# Patient Record
Sex: FEMALE | Race: WHITE | Employment: OTHER | URBAN - METROPOLITAN AREA
[De-identification: names, ages, dates, MRNs, and addresses within clinical notes are randomized per-mention and may not be internally consistent; named-entity substitution may affect disease eponyms.]

---

## 2017-01-19 RX ORDER — BLOOD-GLUCOSE METER
KIT MISCELLANEOUS
Qty: 100 STRIP | Refills: 1 | Status: SHIPPED | OUTPATIENT
Start: 2017-01-19 | End: 2017-01-20 | Stop reason: SDUPTHER

## 2017-01-20 ENCOUNTER — OFFICE VISIT (OUTPATIENT)
Dept: FAMILY MEDICINE CLINIC | Age: 82
End: 2017-01-20

## 2017-01-20 ENCOUNTER — HOSPITAL ENCOUNTER (OUTPATIENT)
Dept: GENERAL RADIOLOGY | Age: 82
Discharge: HOME | End: 2017-01-20

## 2017-01-20 ENCOUNTER — HOSPITAL ENCOUNTER (OUTPATIENT)
Dept: OTHER | Age: 82
Discharge: HOME | End: 2017-01-20
Attending: PHYSICIAN ASSISTANT | Admitting: PHYSICIAN ASSISTANT

## 2017-01-20 VITALS
BODY MASS INDEX: 27.79 KG/M2 | DIASTOLIC BLOOD PRESSURE: 70 MMHG | HEIGHT: 65 IN | WEIGHT: 166.8 LBS | SYSTOLIC BLOOD PRESSURE: 130 MMHG

## 2017-01-20 DIAGNOSIS — M25.511 ACUTE PAIN OF RIGHT SHOULDER: ICD-10-CM

## 2017-01-20 DIAGNOSIS — M25.511 ACUTE PAIN OF RIGHT SHOULDER: Primary | ICD-10-CM

## 2017-01-20 PROCEDURE — 1123F ACP DISCUSS/DSCN MKR DOCD: CPT | Performed by: PHYSICIAN ASSISTANT

## 2017-01-20 PROCEDURE — 4040F PNEUMOC VAC/ADMIN/RCVD: CPT | Performed by: PHYSICIAN ASSISTANT

## 2017-01-20 PROCEDURE — G8427 DOCREV CUR MEDS BY ELIG CLIN: HCPCS | Performed by: PHYSICIAN ASSISTANT

## 2017-01-20 PROCEDURE — G8484 FLU IMMUNIZE NO ADMIN: HCPCS | Performed by: PHYSICIAN ASSISTANT

## 2017-01-20 PROCEDURE — G8599 NO ASA/ANTIPLAT THER USE RNG: HCPCS | Performed by: PHYSICIAN ASSISTANT

## 2017-01-20 PROCEDURE — 1036F TOBACCO NON-USER: CPT | Performed by: PHYSICIAN ASSISTANT

## 2017-01-20 PROCEDURE — 1090F PRES/ABSN URINE INCON ASSESS: CPT | Performed by: PHYSICIAN ASSISTANT

## 2017-01-20 PROCEDURE — G8420 CALC BMI NORM PARAMETERS: HCPCS | Performed by: PHYSICIAN ASSISTANT

## 2017-01-20 PROCEDURE — 99213 OFFICE O/P EST LOW 20 MIN: CPT | Performed by: PHYSICIAN ASSISTANT

## 2017-01-20 ASSESSMENT — ENCOUNTER SYMPTOMS: RESPIRATORY NEGATIVE: 1

## 2017-02-16 ENCOUNTER — OFFICE VISIT (OUTPATIENT)
Dept: ORTHOPEDIC SURGERY | Age: 82
End: 2017-02-16

## 2017-02-16 VITALS
HEIGHT: 65 IN | DIASTOLIC BLOOD PRESSURE: 73 MMHG | SYSTOLIC BLOOD PRESSURE: 132 MMHG | BODY MASS INDEX: 27.81 KG/M2 | WEIGHT: 166.89 LBS | HEART RATE: 70 BPM

## 2017-02-16 DIAGNOSIS — M25.511 RIGHT SHOULDER PAIN, UNSPECIFIED CHRONICITY: ICD-10-CM

## 2017-02-16 DIAGNOSIS — M75.41 SHOULDER IMPINGEMENT, RIGHT: Primary | ICD-10-CM

## 2017-02-16 PROBLEM — M25.819 SHOULDER IMPINGEMENT: Status: ACTIVE | Noted: 2017-02-16

## 2017-02-16 PROBLEM — M75.40 SHOULDER IMPINGEMENT: Status: ACTIVE | Noted: 2017-02-16

## 2017-02-16 PROCEDURE — G8599 NO ASA/ANTIPLAT THER USE RNG: HCPCS | Performed by: ORTHOPAEDIC SURGERY

## 2017-02-16 PROCEDURE — 1123F ACP DISCUSS/DSCN MKR DOCD: CPT | Performed by: ORTHOPAEDIC SURGERY

## 2017-02-16 PROCEDURE — 4040F PNEUMOC VAC/ADMIN/RCVD: CPT | Performed by: ORTHOPAEDIC SURGERY

## 2017-02-16 PROCEDURE — G8420 CALC BMI NORM PARAMETERS: HCPCS | Performed by: ORTHOPAEDIC SURGERY

## 2017-02-16 PROCEDURE — 99213 OFFICE O/P EST LOW 20 MIN: CPT | Performed by: ORTHOPAEDIC SURGERY

## 2017-02-16 PROCEDURE — 1090F PRES/ABSN URINE INCON ASSESS: CPT | Performed by: ORTHOPAEDIC SURGERY

## 2017-02-16 PROCEDURE — 73030 X-RAY EXAM OF SHOULDER: CPT | Performed by: ORTHOPAEDIC SURGERY

## 2017-02-16 PROCEDURE — G8484 FLU IMMUNIZE NO ADMIN: HCPCS | Performed by: ORTHOPAEDIC SURGERY

## 2017-02-16 PROCEDURE — 1036F TOBACCO NON-USER: CPT | Performed by: ORTHOPAEDIC SURGERY

## 2017-02-16 PROCEDURE — G8427 DOCREV CUR MEDS BY ELIG CLIN: HCPCS | Performed by: ORTHOPAEDIC SURGERY

## 2017-02-28 ENCOUNTER — OFFICE VISIT (OUTPATIENT)
Dept: FAMILY MEDICINE CLINIC | Age: 82
End: 2017-02-28

## 2017-02-28 VITALS
SYSTOLIC BLOOD PRESSURE: 130 MMHG | DIASTOLIC BLOOD PRESSURE: 80 MMHG | BODY MASS INDEX: 27.49 KG/M2 | WEIGHT: 165 LBS | HEIGHT: 65 IN

## 2017-02-28 DIAGNOSIS — E11.59 TYPE 2 DIABETES MELLITUS WITH OTHER CIRCULATORY COMPLICATION: Primary | ICD-10-CM

## 2017-02-28 DIAGNOSIS — E78.00 PURE HYPERCHOLESTEROLEMIA: ICD-10-CM

## 2017-02-28 DIAGNOSIS — E11.42 DIABETIC POLYNEUROPATHY ASSOCIATED WITH TYPE 2 DIABETES MELLITUS (HCC): ICD-10-CM

## 2017-02-28 DIAGNOSIS — I10 ESSENTIAL HYPERTENSION: ICD-10-CM

## 2017-02-28 LAB
A/G RATIO: 1.8 (ref 1.1–2.2)
ALBUMIN SERPL-MCNC: 4 G/DL (ref 3.4–5)
ALP BLD-CCNC: 36 U/L (ref 40–129)
ALT SERPL-CCNC: 20 U/L (ref 10–40)
ANION GAP SERPL CALCULATED.3IONS-SCNC: 18 MMOL/L (ref 3–16)
AST SERPL-CCNC: 15 U/L (ref 15–37)
BILIRUB SERPL-MCNC: 0.5 MG/DL (ref 0–1)
BUN BLDV-MCNC: 32 MG/DL (ref 7–20)
CALCIUM SERPL-MCNC: 9.5 MG/DL (ref 8.3–10.6)
CHLORIDE BLD-SCNC: 101 MMOL/L (ref 99–110)
CHOLESTEROL, TOTAL: 159 MG/DL (ref 0–199)
CO2: 23 MMOL/L (ref 21–32)
CREAT SERPL-MCNC: 1.1 MG/DL (ref 0.6–1.2)
GFR AFRICAN AMERICAN: 57
GFR NON-AFRICAN AMERICAN: 47
GLOBULIN: 2.2 G/DL
GLUCOSE BLD-MCNC: 116 MG/DL (ref 70–99)
HBA1C MFR BLD: 6.4 %
HDLC SERPL-MCNC: 71 MG/DL (ref 40–60)
LDL CHOLESTEROL CALCULATED: 60 MG/DL
POTASSIUM SERPL-SCNC: 4.5 MMOL/L (ref 3.5–5.1)
SODIUM BLD-SCNC: 142 MMOL/L (ref 136–145)
TOTAL PROTEIN: 6.2 G/DL (ref 6.4–8.2)
TRIGL SERPL-MCNC: 139 MG/DL (ref 0–150)
VLDLC SERPL CALC-MCNC: 28 MG/DL

## 2017-02-28 PROCEDURE — 4040F PNEUMOC VAC/ADMIN/RCVD: CPT | Performed by: FAMILY MEDICINE

## 2017-02-28 PROCEDURE — 99214 OFFICE O/P EST MOD 30 MIN: CPT | Performed by: FAMILY MEDICINE

## 2017-02-28 PROCEDURE — G8427 DOCREV CUR MEDS BY ELIG CLIN: HCPCS | Performed by: FAMILY MEDICINE

## 2017-02-28 PROCEDURE — G8484 FLU IMMUNIZE NO ADMIN: HCPCS | Performed by: FAMILY MEDICINE

## 2017-02-28 PROCEDURE — G8420 CALC BMI NORM PARAMETERS: HCPCS | Performed by: FAMILY MEDICINE

## 2017-02-28 PROCEDURE — 1090F PRES/ABSN URINE INCON ASSESS: CPT | Performed by: FAMILY MEDICINE

## 2017-02-28 PROCEDURE — 83036 HEMOGLOBIN GLYCOSYLATED A1C: CPT | Performed by: FAMILY MEDICINE

## 2017-02-28 PROCEDURE — 1036F TOBACCO NON-USER: CPT | Performed by: FAMILY MEDICINE

## 2017-02-28 PROCEDURE — 1123F ACP DISCUSS/DSCN MKR DOCD: CPT | Performed by: FAMILY MEDICINE

## 2017-02-28 PROCEDURE — 36415 COLL VENOUS BLD VENIPUNCTURE: CPT | Performed by: FAMILY MEDICINE

## 2017-02-28 PROCEDURE — G8599 NO ASA/ANTIPLAT THER USE RNG: HCPCS | Performed by: FAMILY MEDICINE

## 2017-03-30 ENCOUNTER — OFFICE VISIT (OUTPATIENT)
Dept: ORTHOPEDIC SURGERY | Age: 82
End: 2017-03-30

## 2017-03-30 VITALS
HEART RATE: 66 BPM | WEIGHT: 164.9 LBS | BODY MASS INDEX: 27.47 KG/M2 | SYSTOLIC BLOOD PRESSURE: 133 MMHG | HEIGHT: 65 IN | DIASTOLIC BLOOD PRESSURE: 72 MMHG

## 2017-03-30 DIAGNOSIS — M75.41 SHOULDER IMPINGEMENT, RIGHT: Primary | ICD-10-CM

## 2017-03-30 PROCEDURE — G8427 DOCREV CUR MEDS BY ELIG CLIN: HCPCS | Performed by: ORTHOPAEDIC SURGERY

## 2017-03-30 PROCEDURE — G8420 CALC BMI NORM PARAMETERS: HCPCS | Performed by: ORTHOPAEDIC SURGERY

## 2017-03-30 PROCEDURE — 1036F TOBACCO NON-USER: CPT | Performed by: ORTHOPAEDIC SURGERY

## 2017-03-30 PROCEDURE — 1123F ACP DISCUSS/DSCN MKR DOCD: CPT | Performed by: ORTHOPAEDIC SURGERY

## 2017-03-30 PROCEDURE — 1090F PRES/ABSN URINE INCON ASSESS: CPT | Performed by: ORTHOPAEDIC SURGERY

## 2017-03-30 PROCEDURE — 99213 OFFICE O/P EST LOW 20 MIN: CPT | Performed by: ORTHOPAEDIC SURGERY

## 2017-03-30 PROCEDURE — 4040F PNEUMOC VAC/ADMIN/RCVD: CPT | Performed by: ORTHOPAEDIC SURGERY

## 2017-03-30 PROCEDURE — G8599 NO ASA/ANTIPLAT THER USE RNG: HCPCS | Performed by: ORTHOPAEDIC SURGERY

## 2017-03-30 PROCEDURE — G8484 FLU IMMUNIZE NO ADMIN: HCPCS | Performed by: ORTHOPAEDIC SURGERY

## 2017-04-17 RX ORDER — SIMVASTATIN 40 MG
TABLET ORAL
Qty: 90 TABLET | Refills: 1 | Status: SHIPPED | OUTPATIENT
Start: 2017-04-17 | End: 2017-11-24 | Stop reason: SDUPTHER

## 2017-05-30 ENCOUNTER — OFFICE VISIT (OUTPATIENT)
Dept: FAMILY MEDICINE CLINIC | Age: 82
End: 2017-05-30

## 2017-05-30 VITALS
HEART RATE: 62 BPM | HEIGHT: 65 IN | DIASTOLIC BLOOD PRESSURE: 64 MMHG | BODY MASS INDEX: 27.79 KG/M2 | OXYGEN SATURATION: 96 % | SYSTOLIC BLOOD PRESSURE: 126 MMHG | WEIGHT: 166.8 LBS

## 2017-05-30 DIAGNOSIS — E11.59 TYPE 2 DIABETES MELLITUS WITH OTHER CIRCULATORY COMPLICATION: Primary | ICD-10-CM

## 2017-05-30 DIAGNOSIS — E78.00 PURE HYPERCHOLESTEROLEMIA: ICD-10-CM

## 2017-05-30 DIAGNOSIS — I25.10 CORONARY ARTERY DISEASE INVOLVING NATIVE CORONARY ARTERY OF NATIVE HEART WITHOUT ANGINA PECTORIS: ICD-10-CM

## 2017-05-30 DIAGNOSIS — E11.42 DIABETIC POLYNEUROPATHY ASSOCIATED WITH TYPE 2 DIABETES MELLITUS (HCC): ICD-10-CM

## 2017-05-30 DIAGNOSIS — I10 ESSENTIAL HYPERTENSION: ICD-10-CM

## 2017-05-30 LAB — HBA1C MFR BLD: 6.6 %

## 2017-05-30 PROCEDURE — 1123F ACP DISCUSS/DSCN MKR DOCD: CPT | Performed by: FAMILY MEDICINE

## 2017-05-30 PROCEDURE — 4040F PNEUMOC VAC/ADMIN/RCVD: CPT | Performed by: FAMILY MEDICINE

## 2017-05-30 PROCEDURE — 83036 HEMOGLOBIN GLYCOSYLATED A1C: CPT | Performed by: FAMILY MEDICINE

## 2017-05-30 PROCEDURE — 1090F PRES/ABSN URINE INCON ASSESS: CPT | Performed by: FAMILY MEDICINE

## 2017-05-30 PROCEDURE — G8599 NO ASA/ANTIPLAT THER USE RNG: HCPCS | Performed by: FAMILY MEDICINE

## 2017-05-30 PROCEDURE — G8427 DOCREV CUR MEDS BY ELIG CLIN: HCPCS | Performed by: FAMILY MEDICINE

## 2017-05-30 PROCEDURE — 99214 OFFICE O/P EST MOD 30 MIN: CPT | Performed by: FAMILY MEDICINE

## 2017-05-30 PROCEDURE — G8420 CALC BMI NORM PARAMETERS: HCPCS | Performed by: FAMILY MEDICINE

## 2017-05-30 PROCEDURE — 1036F TOBACCO NON-USER: CPT | Performed by: FAMILY MEDICINE

## 2017-06-08 RX ORDER — TRIAMTERENE AND HYDROCHLOROTHIAZIDE 37.5; 25 MG/1; MG/1
TABLET ORAL
Qty: 90 TABLET | Refills: 3 | Status: SHIPPED | OUTPATIENT
Start: 2017-06-08 | End: 2018-06-15 | Stop reason: SDUPTHER

## 2017-08-29 ENCOUNTER — OFFICE VISIT (OUTPATIENT)
Dept: FAMILY MEDICINE CLINIC | Age: 82
End: 2017-08-29

## 2017-08-29 VITALS
HEART RATE: 63 BPM | OXYGEN SATURATION: 94 % | DIASTOLIC BLOOD PRESSURE: 64 MMHG | BODY MASS INDEX: 28 KG/M2 | WEIGHT: 164 LBS | SYSTOLIC BLOOD PRESSURE: 122 MMHG | HEIGHT: 64 IN

## 2017-08-29 DIAGNOSIS — E11.51 TYPE 2 DIABETES MELLITUS WITH DIABETIC PERIPHERAL ANGIOPATHY WITHOUT GANGRENE, WITHOUT LONG-TERM CURRENT USE OF INSULIN (HCC): Primary | ICD-10-CM

## 2017-08-29 DIAGNOSIS — Z23 NEEDS FLU SHOT: ICD-10-CM

## 2017-08-29 DIAGNOSIS — I10 ESSENTIAL HYPERTENSION: ICD-10-CM

## 2017-08-29 DIAGNOSIS — R13.19 ESOPHAGEAL DYSPHAGIA: ICD-10-CM

## 2017-08-29 DIAGNOSIS — E78.00 PURE HYPERCHOLESTEROLEMIA: ICD-10-CM

## 2017-08-29 DIAGNOSIS — I25.10 CORONARY ARTERY DISEASE INVOLVING NATIVE CORONARY ARTERY OF NATIVE HEART WITHOUT ANGINA PECTORIS: ICD-10-CM

## 2017-08-29 LAB
A/G RATIO: 1.8 (ref 1.1–2.2)
ALBUMIN SERPL-MCNC: 4 G/DL (ref 3.4–5)
ALP BLD-CCNC: 35 U/L (ref 40–129)
ALT SERPL-CCNC: 14 U/L (ref 10–40)
ANION GAP SERPL CALCULATED.3IONS-SCNC: 17 MMOL/L (ref 3–16)
AST SERPL-CCNC: 12 U/L (ref 15–37)
BILIRUB SERPL-MCNC: 0.6 MG/DL (ref 0–1)
BUN BLDV-MCNC: 37 MG/DL (ref 7–20)
CALCIUM SERPL-MCNC: 10 MG/DL (ref 8.3–10.6)
CHLORIDE BLD-SCNC: 102 MMOL/L (ref 99–110)
CHOLESTEROL, TOTAL: 181 MG/DL (ref 0–199)
CO2: 23 MMOL/L (ref 21–32)
CREAT SERPL-MCNC: 1.2 MG/DL (ref 0.6–1.2)
GFR AFRICAN AMERICAN: 51
GFR NON-AFRICAN AMERICAN: 42
GLOBULIN: 2.2 G/DL
GLUCOSE BLD-MCNC: 134 MG/DL (ref 70–99)
HBA1C MFR BLD: 6.5 %
HDLC SERPL-MCNC: 70 MG/DL (ref 40–60)
LDL CHOLESTEROL CALCULATED: 78 MG/DL
POTASSIUM SERPL-SCNC: 4.3 MMOL/L (ref 3.5–5.1)
SODIUM BLD-SCNC: 142 MMOL/L (ref 136–145)
TOTAL PROTEIN: 6.2 G/DL (ref 6.4–8.2)
TRIGL SERPL-MCNC: 167 MG/DL (ref 0–150)
VLDLC SERPL CALC-MCNC: 33 MG/DL

## 2017-08-29 PROCEDURE — 83036 HEMOGLOBIN GLYCOSYLATED A1C: CPT | Performed by: FAMILY MEDICINE

## 2017-08-29 PROCEDURE — 99214 OFFICE O/P EST MOD 30 MIN: CPT | Performed by: FAMILY MEDICINE

## 2017-08-29 PROCEDURE — G8598 ASA/ANTIPLAT THER USED: HCPCS | Performed by: FAMILY MEDICINE

## 2017-08-29 PROCEDURE — 90662 IIV NO PRSV INCREASED AG IM: CPT | Performed by: FAMILY MEDICINE

## 2017-08-29 PROCEDURE — G0008 ADMIN INFLUENZA VIRUS VAC: HCPCS | Performed by: FAMILY MEDICINE

## 2017-08-29 PROCEDURE — 1123F ACP DISCUSS/DSCN MKR DOCD: CPT | Performed by: FAMILY MEDICINE

## 2017-08-29 PROCEDURE — G8427 DOCREV CUR MEDS BY ELIG CLIN: HCPCS | Performed by: FAMILY MEDICINE

## 2017-08-29 PROCEDURE — 4040F PNEUMOC VAC/ADMIN/RCVD: CPT | Performed by: FAMILY MEDICINE

## 2017-08-29 PROCEDURE — 1036F TOBACCO NON-USER: CPT | Performed by: FAMILY MEDICINE

## 2017-08-29 PROCEDURE — 36415 COLL VENOUS BLD VENIPUNCTURE: CPT | Performed by: FAMILY MEDICINE

## 2017-08-29 PROCEDURE — G8419 CALC BMI OUT NRM PARAM NOF/U: HCPCS | Performed by: FAMILY MEDICINE

## 2017-08-29 PROCEDURE — 1090F PRES/ABSN URINE INCON ASSESS: CPT | Performed by: FAMILY MEDICINE

## 2017-08-29 ASSESSMENT — PATIENT HEALTH QUESTIONNAIRE - PHQ9
1. LITTLE INTEREST OR PLEASURE IN DOING THINGS: 0
2. FEELING DOWN, DEPRESSED OR HOPELESS: 0
SUM OF ALL RESPONSES TO PHQ QUESTIONS 1-9: 0
SUM OF ALL RESPONSES TO PHQ9 QUESTIONS 1 & 2: 0

## 2017-11-27 RX ORDER — SIMVASTATIN 40 MG
TABLET ORAL
Qty: 90 TABLET | Refills: 1 | Status: SHIPPED | OUTPATIENT
Start: 2017-11-27 | End: 2018-06-04 | Stop reason: SDUPTHER

## 2017-11-28 ENCOUNTER — OFFICE VISIT (OUTPATIENT)
Dept: FAMILY MEDICINE CLINIC | Age: 82
End: 2017-11-28

## 2017-11-28 VITALS
SYSTOLIC BLOOD PRESSURE: 122 MMHG | BODY MASS INDEX: 28.17 KG/M2 | WEIGHT: 165 LBS | DIASTOLIC BLOOD PRESSURE: 64 MMHG | HEIGHT: 64 IN

## 2017-11-28 DIAGNOSIS — E78.00 PURE HYPERCHOLESTEROLEMIA: ICD-10-CM

## 2017-11-28 DIAGNOSIS — E11.51 TYPE 2 DIABETES MELLITUS WITH DIABETIC PERIPHERAL ANGIOPATHY WITHOUT GANGRENE, WITHOUT LONG-TERM CURRENT USE OF INSULIN (HCC): Primary | ICD-10-CM

## 2017-11-28 DIAGNOSIS — I10 ESSENTIAL HYPERTENSION: ICD-10-CM

## 2017-11-28 PROBLEM — M25.819 SHOULDER IMPINGEMENT: Status: RESOLVED | Noted: 2017-02-16 | Resolved: 2017-11-28

## 2017-11-28 PROBLEM — M75.40 SHOULDER IMPINGEMENT: Status: RESOLVED | Noted: 2017-02-16 | Resolved: 2017-11-28

## 2017-11-28 LAB — HBA1C MFR BLD: 6.7 %

## 2017-11-28 PROCEDURE — G8417 CALC BMI ABV UP PARAM F/U: HCPCS | Performed by: FAMILY MEDICINE

## 2017-11-28 PROCEDURE — 99214 OFFICE O/P EST MOD 30 MIN: CPT | Performed by: FAMILY MEDICINE

## 2017-11-28 PROCEDURE — 1090F PRES/ABSN URINE INCON ASSESS: CPT | Performed by: FAMILY MEDICINE

## 2017-11-28 PROCEDURE — G8598 ASA/ANTIPLAT THER USED: HCPCS | Performed by: FAMILY MEDICINE

## 2017-11-28 PROCEDURE — 1123F ACP DISCUSS/DSCN MKR DOCD: CPT | Performed by: FAMILY MEDICINE

## 2017-11-28 PROCEDURE — G8484 FLU IMMUNIZE NO ADMIN: HCPCS | Performed by: FAMILY MEDICINE

## 2017-11-28 PROCEDURE — 4040F PNEUMOC VAC/ADMIN/RCVD: CPT | Performed by: FAMILY MEDICINE

## 2017-11-28 PROCEDURE — G8427 DOCREV CUR MEDS BY ELIG CLIN: HCPCS | Performed by: FAMILY MEDICINE

## 2017-11-28 PROCEDURE — 1036F TOBACCO NON-USER: CPT | Performed by: FAMILY MEDICINE

## 2017-11-28 PROCEDURE — 83036 HEMOGLOBIN GLYCOSYLATED A1C: CPT | Performed by: FAMILY MEDICINE

## 2017-11-28 NOTE — PROGRESS NOTES
hemoglobin (Hb A1C), Well controlled continue current medicines    2. Essential hypertension  Well controlled    3. Pure hypercholesterolemia  Well controlled              Plan:  1)  Medication: continue current medication regimen unchanged  2)  Recheck in 3 months, sooner should new symptoms or problems arise. 3) LLR, Reviewed  [place] received counseling on the following healthy behaviors: nutrition, exercise and medication adherence         Discussed use, benefit, and side effects of prescribed medications. Barriers to medication compliance addressed. All patient questions answered. Pt voiced understanding.            Steffany López M.D.

## 2018-01-16 ENCOUNTER — TELEPHONE (OUTPATIENT)
Dept: FAMILY MEDICINE CLINIC | Age: 83
End: 2018-01-16

## 2018-01-16 NOTE — TELEPHONE ENCOUNTER
We received a fax from Dr. Saurabh Moe, [facility]. They are requesting patients, most recent hemoglobin A1c. I have faxed over recent A1c reading this am, for patients appointment today with Dr. Ignacia Roberts. *Fax confirmation attached, to today's telephone encounter.

## 2018-02-27 ENCOUNTER — OFFICE VISIT (OUTPATIENT)
Dept: FAMILY MEDICINE CLINIC | Age: 83
End: 2018-02-27

## 2018-02-27 VITALS
OXYGEN SATURATION: 94 % | HEIGHT: 64 IN | DIASTOLIC BLOOD PRESSURE: 56 MMHG | HEART RATE: 62 BPM | BODY MASS INDEX: 27.96 KG/M2 | SYSTOLIC BLOOD PRESSURE: 118 MMHG | WEIGHT: 163.8 LBS

## 2018-02-27 DIAGNOSIS — E11.42 DIABETIC POLYNEUROPATHY ASSOCIATED WITH TYPE 2 DIABETES MELLITUS (HCC): ICD-10-CM

## 2018-02-27 DIAGNOSIS — E78.00 PURE HYPERCHOLESTEROLEMIA: ICD-10-CM

## 2018-02-27 DIAGNOSIS — E11.51 TYPE 2 DIABETES MELLITUS WITH DIABETIC PERIPHERAL ANGIOPATHY WITHOUT GANGRENE, WITHOUT LONG-TERM CURRENT USE OF INSULIN (HCC): Primary | ICD-10-CM

## 2018-02-27 DIAGNOSIS — I10 ESSENTIAL HYPERTENSION: ICD-10-CM

## 2018-02-27 LAB
A/G RATIO: 2.1 (ref 1.1–2.2)
ALBUMIN SERPL-MCNC: 4.2 G/DL (ref 3.4–5)
ALP BLD-CCNC: 36 U/L (ref 40–129)
ALT SERPL-CCNC: 16 U/L (ref 10–40)
ANION GAP SERPL CALCULATED.3IONS-SCNC: 15 MMOL/L (ref 3–16)
AST SERPL-CCNC: 15 U/L (ref 15–37)
BILIRUB SERPL-MCNC: 0.6 MG/DL (ref 0–1)
BUN BLDV-MCNC: 31 MG/DL (ref 7–20)
CALCIUM SERPL-MCNC: 9.2 MG/DL (ref 8.3–10.6)
CHLORIDE BLD-SCNC: 103 MMOL/L (ref 99–110)
CHOLESTEROL, TOTAL: 138 MG/DL (ref 0–199)
CO2: 25 MMOL/L (ref 21–32)
CREAT SERPL-MCNC: 1.1 MG/DL (ref 0.6–1.2)
GFR AFRICAN AMERICAN: 57
GFR NON-AFRICAN AMERICAN: 47
GLOBULIN: 2 G/DL
GLUCOSE BLD-MCNC: 120 MG/DL (ref 70–99)
HBA1C MFR BLD: 6.7 %
HDLC SERPL-MCNC: 67 MG/DL (ref 40–60)
LDL CHOLESTEROL CALCULATED: 40 MG/DL
POTASSIUM SERPL-SCNC: 4.2 MMOL/L (ref 3.5–5.1)
SODIUM BLD-SCNC: 143 MMOL/L (ref 136–145)
TOTAL PROTEIN: 6.2 G/DL (ref 6.4–8.2)
TRIGL SERPL-MCNC: 154 MG/DL (ref 0–150)
VLDLC SERPL CALC-MCNC: 31 MG/DL

## 2018-02-27 PROCEDURE — G8427 DOCREV CUR MEDS BY ELIG CLIN: HCPCS | Performed by: FAMILY MEDICINE

## 2018-02-27 PROCEDURE — G8484 FLU IMMUNIZE NO ADMIN: HCPCS | Performed by: FAMILY MEDICINE

## 2018-02-27 PROCEDURE — G8417 CALC BMI ABV UP PARAM F/U: HCPCS | Performed by: FAMILY MEDICINE

## 2018-02-27 PROCEDURE — 1036F TOBACCO NON-USER: CPT | Performed by: FAMILY MEDICINE

## 2018-02-27 PROCEDURE — 36415 COLL VENOUS BLD VENIPUNCTURE: CPT | Performed by: FAMILY MEDICINE

## 2018-02-27 PROCEDURE — 1090F PRES/ABSN URINE INCON ASSESS: CPT | Performed by: FAMILY MEDICINE

## 2018-02-27 PROCEDURE — 1123F ACP DISCUSS/DSCN MKR DOCD: CPT | Performed by: FAMILY MEDICINE

## 2018-02-27 PROCEDURE — 4040F PNEUMOC VAC/ADMIN/RCVD: CPT | Performed by: FAMILY MEDICINE

## 2018-02-27 PROCEDURE — 83036 HEMOGLOBIN GLYCOSYLATED A1C: CPT | Performed by: FAMILY MEDICINE

## 2018-02-27 PROCEDURE — 99214 OFFICE O/P EST MOD 30 MIN: CPT | Performed by: FAMILY MEDICINE

## 2018-02-27 PROCEDURE — G8598 ASA/ANTIPLAT THER USED: HCPCS | Performed by: FAMILY MEDICINE

## 2018-02-27 RX ORDER — GABAPENTIN 100 MG/1
100 CAPSULE ORAL 3 TIMES DAILY
Qty: 90 CAPSULE | Refills: 3 | Status: SHIPPED | OUTPATIENT
Start: 2018-02-27 | End: 2018-04-12 | Stop reason: SDUPTHER

## 2018-02-27 RX ORDER — FLUTICASONE PROPIONATE 50 MCG
2 SPRAY, SUSPENSION (ML) NASAL DAILY
Qty: 1 BOTTLE | Refills: 3 | Status: SHIPPED | OUTPATIENT
Start: 2018-02-27 | End: 2018-06-04 | Stop reason: SDUPTHER

## 2018-02-27 NOTE — PROGRESS NOTES
BP Readings from Last 2 Encounters:   11/28/17 122/64   08/29/17 122/64     Hemoglobin A1C (%)   Date Value   11/28/2017 6.7     Microalbumin, Random Urine (mg/dL)   Date Value   10/21/2014 <1.20     LDL Calculated (mg/dL)   Date Value   08/29/2017 78              Tobacco use:  Patient  reports that she quit smoking about 34 years ago. She has never used smokeless tobacco.    If Smoker - Cessation materials given? No    Is Daily aspirin on medication list?:  Yes    Diabetic retinal exam done? No   If yes, document in Health Maintenance. Monofilament placed on counter? Yes    Shoes and socks removed? Yes    BP taken with correct size cuff? Yes   Repeated if > 140/90 NA     Is patient taking any medications for diabetes    Yes   If yes, see medication list    Is the patient reporting any side effects of diabetic medications? No    Microalbumin performed if applicable? 10/21/14      Is patient taking any over the counter medications    Yes   If yes, see medication list  SUBJECTIVE:  Kel Fuller is a 80 y.o. female who presents for evaluation of hypertension, Diabetes Mellitus and hyperlipidemia. She indicates that she is feeling well and denies any symptoms referable to her elevated blood pressure, diabetes or hyperlipidemia. Specifically denies chest pain, , dyspnea, orthopnea, PND,peripheral edema , . No anorexia,  or leg cramps noted. No episodes of hypoglycemia. Current medication regimen is as listed below. She denies any side effects of medication, and has been taking it regularly. Patient has a history of diabetic neuropathy. She sees a podiatrist on a regular basis. She states that her foot pain is worsening to the point where is hard for her to ambulate. She has never been on gabapentin or any other medications for this. She is due to see cardiology this spring. She's having a dental procedure tomorrow.  She started with some nasal congestion and cough yesterday    Blood Pressure:   Blood pressures are not being checked at home. Ranges have been :     Diabetes: The last A1C: 6.7. Diabetic complications are: Neuropathy CAD. The Blood sugars range at home have been: . Last Eye Exam : 2017. The last microalbumin:    Lab Results   Component Value Date    LABMICR <1.20 10/21/2014         she is taking aspirin. History   Smoking Status    Former Smoker    Quit date: 9/10/1983   Smokeless Tobacco    Never Used       Current Outpatient Prescriptions   Medication Sig Dispense Refill    fluticasone (FLONASE) 50 MCG/ACT nasal spray 2 sprays by Nasal route daily 1 Bottle 3    gabapentin (NEURONTIN) 100 MG capsule Take 1 capsule by mouth 3 times daily for 59 days. 90 capsule 3    simvastatin (ZOCOR) 40 MG tablet TAKE 1 TABLET BY MOUTH IN THE EVENING 90 tablet 1    metFORMIN (GLUCOPHAGE) 500 MG tablet TAKE 1 TABLET BY MOUTH 2 TIMES DAILY (WITH MEALS). 180 tablet 1    aspirin 81 MG tablet Take 81 mg by mouth daily      triamterene-hydrochlorothiazide (MAXZIDE-25) 37.5-25 MG per tablet TAKE 1 TABLET BY MOUTH EVERY DAY 90 tablet 3    glucose blood VI test strips (FREESTYLE LITE) strip Pt test twice daily E11.9 100 strip 1    Compression Stockings MISC by Does not apply route 1 each 0    vitamin B-12 (CYANOCOBALAMIN) 250 MCG tablet Take 250 mcg by mouth daily      vitamin D3 (COLECALCIFEROL) 400 UNITS TABS Take by mouth daily      FREESTYLE LANCETS MISC TEST TWICE A DAY AS DIRECTED dx 250.00 100 each 3    ibuprofen (ADVIL;MOTRIN) 200 MG tablet Take 200 mg by mouth 2 times daily.  Multiple Vitamin (MULTIVITAMIN PO) Take  by mouth daily.  metoprolol (LOPRESSOR) 25 MG tablet 25 mg 2 times daily. Patient instructed to take beta blockers with a sip of water the morning of surgery per Dr. Eb Elaine. No current facility-administered medications for this visit.         OBJECTIVE:  BP (!) 118/56 (Site: Left Arm, Position: Sitting, Cuff Size: Medium Adult)   Pulse 62   Ht 5' 4\" (1.626 m)

## 2018-03-02 ENCOUNTER — TELEPHONE (OUTPATIENT)
Dept: FAMILY MEDICINE CLINIC | Age: 83
End: 2018-03-02

## 2018-03-02 NOTE — TELEPHONE ENCOUNTER
The patient is calling to give an update on the gabapentin medication Dr. Burton Devi started her on recently. She said that she recently started with a cold so she's not sure how the medication is doing because the symptoms from the cold are there. She is going to see if her symptoms clear up then she will let us know how the medication is doing for her.

## 2018-03-06 ENCOUNTER — TELEPHONE (OUTPATIENT)
Dept: FAMILY MEDICINE CLINIC | Age: 83
End: 2018-03-06

## 2018-03-07 ENCOUNTER — OFFICE VISIT (OUTPATIENT)
Dept: FAMILY MEDICINE CLINIC | Age: 83
End: 2018-03-07

## 2018-03-07 VITALS
OXYGEN SATURATION: 90 % | HEIGHT: 64 IN | HEART RATE: 75 BPM | WEIGHT: 160.4 LBS | SYSTOLIC BLOOD PRESSURE: 102 MMHG | DIASTOLIC BLOOD PRESSURE: 50 MMHG | BODY MASS INDEX: 27.39 KG/M2

## 2018-03-07 DIAGNOSIS — R05.9 COUGH: Primary | ICD-10-CM

## 2018-03-07 DIAGNOSIS — R06.02 SOB (SHORTNESS OF BREATH): ICD-10-CM

## 2018-03-07 PROCEDURE — 4040F PNEUMOC VAC/ADMIN/RCVD: CPT | Performed by: PHYSICIAN ASSISTANT

## 2018-03-07 PROCEDURE — G8484 FLU IMMUNIZE NO ADMIN: HCPCS | Performed by: PHYSICIAN ASSISTANT

## 2018-03-07 PROCEDURE — 1090F PRES/ABSN URINE INCON ASSESS: CPT | Performed by: PHYSICIAN ASSISTANT

## 2018-03-07 PROCEDURE — G8598 ASA/ANTIPLAT THER USED: HCPCS | Performed by: PHYSICIAN ASSISTANT

## 2018-03-07 PROCEDURE — G8427 DOCREV CUR MEDS BY ELIG CLIN: HCPCS | Performed by: PHYSICIAN ASSISTANT

## 2018-03-07 PROCEDURE — 99213 OFFICE O/P EST LOW 20 MIN: CPT | Performed by: PHYSICIAN ASSISTANT

## 2018-03-07 PROCEDURE — 1123F ACP DISCUSS/DSCN MKR DOCD: CPT | Performed by: PHYSICIAN ASSISTANT

## 2018-03-07 PROCEDURE — G8417 CALC BMI ABV UP PARAM F/U: HCPCS | Performed by: PHYSICIAN ASSISTANT

## 2018-03-07 PROCEDURE — 1036F TOBACCO NON-USER: CPT | Performed by: PHYSICIAN ASSISTANT

## 2018-03-07 RX ORDER — BENZONATATE 100 MG/1
100 CAPSULE ORAL 3 TIMES DAILY PRN
Qty: 21 CAPSULE | Refills: 0 | Status: SHIPPED | OUTPATIENT
Start: 2018-03-07 | End: 2018-03-14

## 2018-03-07 ASSESSMENT — ENCOUNTER SYMPTOMS
COUGH: 1
GASTROINTESTINAL NEGATIVE: 1
SHORTNESS OF BREATH: 1

## 2018-03-07 NOTE — PROGRESS NOTES
Subjective:      Patient ID: Zoltan Stringer is a 80 y.o. female. HPI  Patient presents with a cough and SOB for over a week. Has been taking mucinex. The cough is dry. Has been very tired the last week also. Is having nasal congestion and pnd. The SOB is worse with activity. No more edema than normal or weight gain. Symptoms seem a little better today than yesterday. Review of Systems   Constitutional: Positive for fatigue. HENT: Positive for congestion and postnasal drip. Respiratory: Positive for cough and shortness of breath. Cardiovascular: Negative. Gastrointestinal: Negative. Objective:   Physical Exam   Constitutional: She is oriented to person, place, and time. She appears well-developed and well-nourished. HENT:   Mouth/Throat: Oropharynx is clear and moist.   Cardiovascular: Normal rate and regular rhythm. Pulmonary/Chest: Effort normal and breath sounds normal.   Wet sounding cough   Neurological: She is alert and oriented to person, place, and time. Skin: Skin is warm and dry. Psychiatric: She has a normal mood and affect. Assessment:      1. Cough     2. SOB (shortness of breath)             Plan:      Continue mucinex and add tessalon. If worsening symptoms to call.

## 2018-03-27 ENCOUNTER — OFFICE VISIT (OUTPATIENT)
Dept: FAMILY MEDICINE CLINIC | Age: 83
End: 2018-03-27

## 2018-03-27 VITALS
DIASTOLIC BLOOD PRESSURE: 58 MMHG | BODY MASS INDEX: 27.83 KG/M2 | OXYGEN SATURATION: 95 % | SYSTOLIC BLOOD PRESSURE: 106 MMHG | HEART RATE: 75 BPM | WEIGHT: 163 LBS | HEIGHT: 64 IN

## 2018-03-27 DIAGNOSIS — E11.51 TYPE 2 DIABETES MELLITUS WITH DIABETIC PERIPHERAL ANGIOPATHY WITHOUT GANGRENE, WITHOUT LONG-TERM CURRENT USE OF INSULIN (HCC): ICD-10-CM

## 2018-03-27 DIAGNOSIS — R20.0 NUMBNESS AND TINGLING OF BOTH FEET: Primary | ICD-10-CM

## 2018-03-27 DIAGNOSIS — R20.2 NUMBNESS AND TINGLING OF BOTH FEET: Primary | ICD-10-CM

## 2018-03-27 LAB
FOLATE: >20 NG/ML (ref 4.78–24.2)
VITAMIN B-12: 1332 PG/ML (ref 211–911)

## 2018-03-27 PROCEDURE — G8482 FLU IMMUNIZE ORDER/ADMIN: HCPCS | Performed by: FAMILY MEDICINE

## 2018-03-27 PROCEDURE — 4040F PNEUMOC VAC/ADMIN/RCVD: CPT | Performed by: FAMILY MEDICINE

## 2018-03-27 PROCEDURE — G8427 DOCREV CUR MEDS BY ELIG CLIN: HCPCS | Performed by: FAMILY MEDICINE

## 2018-03-27 PROCEDURE — 1123F ACP DISCUSS/DSCN MKR DOCD: CPT | Performed by: FAMILY MEDICINE

## 2018-03-27 PROCEDURE — G8598 ASA/ANTIPLAT THER USED: HCPCS | Performed by: FAMILY MEDICINE

## 2018-03-27 PROCEDURE — 36415 COLL VENOUS BLD VENIPUNCTURE: CPT | Performed by: FAMILY MEDICINE

## 2018-03-27 PROCEDURE — 1036F TOBACCO NON-USER: CPT | Performed by: FAMILY MEDICINE

## 2018-03-27 PROCEDURE — 99213 OFFICE O/P EST LOW 20 MIN: CPT | Performed by: FAMILY MEDICINE

## 2018-03-27 PROCEDURE — G8417 CALC BMI ABV UP PARAM F/U: HCPCS | Performed by: FAMILY MEDICINE

## 2018-03-27 PROCEDURE — 1090F PRES/ABSN URINE INCON ASSESS: CPT | Performed by: FAMILY MEDICINE

## 2018-03-27 ASSESSMENT — ENCOUNTER SYMPTOMS
SHORTNESS OF BREATH: 0
COUGH: 1

## 2018-03-27 NOTE — PROGRESS NOTES
Patient:  Shari mcqueen 80 y.o. femalewho presents today with the following Chief Complaint(s):  Chief Complaint   Patient presents with    Diabetes     Pt is here for a 1 month follow up. No questions.  Hyperlipidemia    Hypertension       Patient is here to follow up today on her diabetic neuropathy. One month ago she was seen and at that time was complaining about burning pain in both of her feet. It was enough to where she wanted to try medication. We started her on gabapentin and a very slow increasing dose. She is now on 100 mg 3 times a day. She says the burning and tingling has lessened, however she's feeling very groggy and no energy. She is also recovered from a respiratory infection. She is not coughing as much but her malaise may be partly due to that as well. Current Outpatient Prescriptions   Medication Sig Dispense Refill    fluticasone (FLONASE) 50 MCG/ACT nasal spray 2 sprays by Nasal route daily 1 Bottle 3    gabapentin (NEURONTIN) 100 MG capsule Take 1 capsule by mouth 3 times daily for 59 days. 90 capsule 3    simvastatin (ZOCOR) 40 MG tablet TAKE 1 TABLET BY MOUTH IN THE EVENING 90 tablet 1    metFORMIN (GLUCOPHAGE) 500 MG tablet TAKE 1 TABLET BY MOUTH 2 TIMES DAILY (WITH MEALS). 180 tablet 1    aspirin 81 MG tablet Take 81 mg by mouth daily      triamterene-hydrochlorothiazide (MAXZIDE-25) 37.5-25 MG per tablet TAKE 1 TABLET BY MOUTH EVERY DAY 90 tablet 3    glucose blood VI test strips (FREESTYLE LITE) strip Pt test twice daily E11.9 100 strip 1    Compression Stockings MISC by Does not apply route 1 each 0    vitamin B-12 (CYANOCOBALAMIN) 250 MCG tablet Take 250 mcg by mouth daily      FREESTYLE LANCETS MISC TEST TWICE A DAY AS DIRECTED dx 250.00 100 each 3    ibuprofen (ADVIL;MOTRIN) 200 MG tablet Take 200 mg by mouth 2 times daily.  Multiple Vitamin (MULTIVITAMIN PO) Take  by mouth daily.  metoprolol (LOPRESSOR) 25 MG tablet 25 mg 2 times daily. Patient instructed to take beta blockers with a sip of water the morning of surgery per Dr. Gabriella Aragon. No current facility-administered medications for this visit. Patients past medical history, surgical history, family history, medications and allergies were all reviewed and updated as appropriate today. Review of Systems   Constitutional: Positive for malaise/fatigue. Negative for fever. Respiratory: Positive for cough. Negative for shortness of breath. Cardiovascular: Positive for leg swelling. Negative for chest pain and palpitations. Musculoskeletal: Negative for falls. Neurological: Negative for weakness. Physical Exam   Constitutional: She is oriented to person, place, and time. No distress. Cardiovascular: Normal heart sounds and intact distal pulses. Pulmonary/Chest: Breath sounds normal. She has no wheezes. She has no rales. Musculoskeletal: She exhibits edema. Neurological: She is alert and oriented to person, place, and time. No cranial nerve deficit. Vitals:    03/27/18 1404   BP: (!) 106/58   Site: Left Arm   Position: Sitting   Cuff Size: Medium Adult   Pulse: 75   SpO2: 95%   Weight: 163 lb (73.9 kg)   Height: 5' 4\" (1.626 m)       Assessment/Plan:   Percy Whyte was seen today for diabetes, hyperlipidemia and hypertension. Diagnoses and all orders for this visit:    Numbness and tingling of both feet  -     Vitamin B12 & Folate    Type 2 diabetes mellitus with diabetic peripheral angiopathy without gangrene, without long-term current use of insulin ([facility])  -     Cancel: POCT glycosylated hemoglobin (Hb A1C)      At this point we will try reducing the dose of her gabapentin back to 100 mg twice a day. She says she is sleeping better so she will take the second dose at nighttime. Our goal is to find a balance between less neuropathic pain and less fatigue and grogginess. We will check a B12 level since she is on metformin.  We will see her back in 2 months, she will call sooner if problems

## 2018-03-28 ENCOUNTER — TELEPHONE (OUTPATIENT)
Dept: FAMILY MEDICINE CLINIC | Age: 83
End: 2018-03-28

## 2018-03-28 NOTE — TELEPHONE ENCOUNTER
Patient returned call regarding lab results. I read the physician notes, she understood, no questions.

## 2018-04-26 DIAGNOSIS — E11.42 DIABETIC POLYNEUROPATHY ASSOCIATED WITH TYPE 2 DIABETES MELLITUS (HCC): ICD-10-CM

## 2018-04-26 RX ORDER — GABAPENTIN 100 MG/1
100 CAPSULE ORAL 2 TIMES DAILY
Qty: 60 CAPSULE | Refills: 3 | Status: SHIPPED | OUTPATIENT
Start: 2018-04-26 | End: 2018-11-27 | Stop reason: SDUPTHER

## 2018-06-04 ENCOUNTER — OFFICE VISIT (OUTPATIENT)
Dept: FAMILY MEDICINE CLINIC | Age: 83
End: 2018-06-04

## 2018-06-04 VITALS
DIASTOLIC BLOOD PRESSURE: 60 MMHG | SYSTOLIC BLOOD PRESSURE: 130 MMHG | BODY MASS INDEX: 25.91 KG/M2 | HEIGHT: 66 IN | WEIGHT: 161.2 LBS | HEART RATE: 59 BPM | OXYGEN SATURATION: 92 %

## 2018-06-04 DIAGNOSIS — I10 ESSENTIAL HYPERTENSION: Primary | ICD-10-CM

## 2018-06-04 DIAGNOSIS — E78.00 PURE HYPERCHOLESTEROLEMIA: ICD-10-CM

## 2018-06-04 DIAGNOSIS — E11.51 TYPE 2 DIABETES MELLITUS WITH DIABETIC PERIPHERAL ANGIOPATHY WITHOUT GANGRENE, WITHOUT LONG-TERM CURRENT USE OF INSULIN (HCC): ICD-10-CM

## 2018-06-04 LAB
A/G RATIO: 1.9 (ref 1.1–2.2)
ALBUMIN SERPL-MCNC: 4.1 G/DL (ref 3.4–5)
ALP BLD-CCNC: 38 U/L (ref 40–129)
ALT SERPL-CCNC: 17 U/L (ref 10–40)
ANION GAP SERPL CALCULATED.3IONS-SCNC: 18 MMOL/L (ref 3–16)
AST SERPL-CCNC: 14 U/L (ref 15–37)
BILIRUB SERPL-MCNC: 0.5 MG/DL (ref 0–1)
BUN BLDV-MCNC: 29 MG/DL (ref 7–20)
CALCIUM SERPL-MCNC: 9.7 MG/DL (ref 8.3–10.6)
CHLORIDE BLD-SCNC: 104 MMOL/L (ref 99–110)
CHOLESTEROL, TOTAL: 159 MG/DL (ref 0–199)
CO2: 24 MMOL/L (ref 21–32)
CREAT SERPL-MCNC: 1.1 MG/DL (ref 0.6–1.2)
GFR AFRICAN AMERICAN: 57
GFR NON-AFRICAN AMERICAN: 47
GLOBULIN: 2.2 G/DL
GLUCOSE BLD-MCNC: 120 MG/DL (ref 70–99)
HBA1C MFR BLD: 6.5 %
HDLC SERPL-MCNC: 63 MG/DL (ref 40–60)
LDL CHOLESTEROL CALCULATED: 61 MG/DL
POTASSIUM SERPL-SCNC: 4.3 MMOL/L (ref 3.5–5.1)
SODIUM BLD-SCNC: 146 MMOL/L (ref 136–145)
TOTAL PROTEIN: 6.3 G/DL (ref 6.4–8.2)
TRIGL SERPL-MCNC: 175 MG/DL (ref 0–150)
VLDLC SERPL CALC-MCNC: 35 MG/DL

## 2018-06-04 PROCEDURE — 99213 OFFICE O/P EST LOW 20 MIN: CPT | Performed by: PHYSICIAN ASSISTANT

## 2018-06-04 PROCEDURE — 1090F PRES/ABSN URINE INCON ASSESS: CPT | Performed by: PHYSICIAN ASSISTANT

## 2018-06-04 PROCEDURE — 4040F PNEUMOC VAC/ADMIN/RCVD: CPT | Performed by: PHYSICIAN ASSISTANT

## 2018-06-04 PROCEDURE — 83036 HEMOGLOBIN GLYCOSYLATED A1C: CPT | Performed by: PHYSICIAN ASSISTANT

## 2018-06-04 PROCEDURE — 1036F TOBACCO NON-USER: CPT | Performed by: PHYSICIAN ASSISTANT

## 2018-06-04 PROCEDURE — 36415 COLL VENOUS BLD VENIPUNCTURE: CPT | Performed by: PHYSICIAN ASSISTANT

## 2018-06-04 PROCEDURE — 1123F ACP DISCUSS/DSCN MKR DOCD: CPT | Performed by: PHYSICIAN ASSISTANT

## 2018-06-04 PROCEDURE — G8427 DOCREV CUR MEDS BY ELIG CLIN: HCPCS | Performed by: PHYSICIAN ASSISTANT

## 2018-06-04 PROCEDURE — G8417 CALC BMI ABV UP PARAM F/U: HCPCS | Performed by: PHYSICIAN ASSISTANT

## 2018-06-04 PROCEDURE — G8598 ASA/ANTIPLAT THER USED: HCPCS | Performed by: PHYSICIAN ASSISTANT

## 2018-06-04 RX ORDER — SIMVASTATIN 40 MG
TABLET ORAL
Qty: 90 TABLET | Refills: 1 | Status: SHIPPED | OUTPATIENT
Start: 2018-06-04 | End: 2018-11-27 | Stop reason: SDUPTHER

## 2018-06-04 RX ORDER — FLUTICASONE PROPIONATE 50 MCG
2 SPRAY, SUSPENSION (ML) NASAL DAILY
Qty: 1 BOTTLE | Refills: 1 | Status: ON HOLD | OUTPATIENT
Start: 2018-06-04 | End: 2021-10-27

## 2018-06-05 LAB — DIABETIC RETINOPATHY: NEGATIVE

## 2018-06-18 RX ORDER — TRIAMTERENE AND HYDROCHLOROTHIAZIDE 37.5; 25 MG/1; MG/1
TABLET ORAL
Qty: 90 TABLET | Refills: 3 | Status: SHIPPED | OUTPATIENT
Start: 2018-06-18 | End: 2019-05-28 | Stop reason: SDUPTHER

## 2018-09-04 ENCOUNTER — OFFICE VISIT (OUTPATIENT)
Dept: FAMILY MEDICINE CLINIC | Age: 83
End: 2018-09-04

## 2018-09-04 VITALS
SYSTOLIC BLOOD PRESSURE: 130 MMHG | WEIGHT: 161 LBS | DIASTOLIC BLOOD PRESSURE: 60 MMHG | BODY MASS INDEX: 25.88 KG/M2 | HEART RATE: 66 BPM | HEIGHT: 66 IN | OXYGEN SATURATION: 97 %

## 2018-09-04 DIAGNOSIS — E78.00 PURE HYPERCHOLESTEROLEMIA: ICD-10-CM

## 2018-09-04 DIAGNOSIS — Z23 FLU VACCINE NEED: ICD-10-CM

## 2018-09-04 DIAGNOSIS — I10 ESSENTIAL HYPERTENSION: ICD-10-CM

## 2018-09-04 DIAGNOSIS — I25.10 CORONARY ARTERY DISEASE INVOLVING NATIVE CORONARY ARTERY OF NATIVE HEART WITHOUT ANGINA PECTORIS: ICD-10-CM

## 2018-09-04 DIAGNOSIS — E11.51 TYPE 2 DIABETES MELLITUS WITH DIABETIC PERIPHERAL ANGIOPATHY WITHOUT GANGRENE, WITHOUT LONG-TERM CURRENT USE OF INSULIN (HCC): Primary | ICD-10-CM

## 2018-09-04 PROBLEM — M25.511 RIGHT SHOULDER PAIN: Status: RESOLVED | Noted: 2017-02-16 | Resolved: 2018-09-04

## 2018-09-04 LAB — HBA1C MFR BLD: 6.3 %

## 2018-09-04 PROCEDURE — 83036 HEMOGLOBIN GLYCOSYLATED A1C: CPT | Performed by: FAMILY MEDICINE

## 2018-09-04 PROCEDURE — G8427 DOCREV CUR MEDS BY ELIG CLIN: HCPCS | Performed by: FAMILY MEDICINE

## 2018-09-04 PROCEDURE — 1090F PRES/ABSN URINE INCON ASSESS: CPT | Performed by: FAMILY MEDICINE

## 2018-09-04 PROCEDURE — G8417 CALC BMI ABV UP PARAM F/U: HCPCS | Performed by: FAMILY MEDICINE

## 2018-09-04 PROCEDURE — 1123F ACP DISCUSS/DSCN MKR DOCD: CPT | Performed by: FAMILY MEDICINE

## 2018-09-04 PROCEDURE — 1036F TOBACCO NON-USER: CPT | Performed by: FAMILY MEDICINE

## 2018-09-04 PROCEDURE — 1101F PT FALLS ASSESS-DOCD LE1/YR: CPT | Performed by: FAMILY MEDICINE

## 2018-09-04 PROCEDURE — 4040F PNEUMOC VAC/ADMIN/RCVD: CPT | Performed by: FAMILY MEDICINE

## 2018-09-04 PROCEDURE — G0008 ADMIN INFLUENZA VIRUS VAC: HCPCS | Performed by: FAMILY MEDICINE

## 2018-09-04 PROCEDURE — G8598 ASA/ANTIPLAT THER USED: HCPCS | Performed by: FAMILY MEDICINE

## 2018-09-04 PROCEDURE — 99214 OFFICE O/P EST MOD 30 MIN: CPT | Performed by: FAMILY MEDICINE

## 2018-09-04 PROCEDURE — 90662 IIV NO PRSV INCREASED AG IM: CPT | Performed by: FAMILY MEDICINE

## 2018-09-04 ASSESSMENT — PATIENT HEALTH QUESTIONNAIRE - PHQ9
1. LITTLE INTEREST OR PLEASURE IN DOING THINGS: 0
SUM OF ALL RESPONSES TO PHQ QUESTIONS 1-9: 0
2. FEELING DOWN, DEPRESSED OR HOPELESS: 0
SUM OF ALL RESPONSES TO PHQ QUESTIONS 1-9: 0
SUM OF ALL RESPONSES TO PHQ9 QUESTIONS 1 & 2: 0

## 2018-09-04 NOTE — PROGRESS NOTES
BP Readings from Last 2 Encounters:   09/04/18 130/60   06/04/18 130/60     Hemoglobin A1C (%)   Date Value   09/04/2018 6.3     Microalbumin, Random Urine (mg/dL)   Date Value   10/21/2014 <1.20     LDL Calculated (mg/dL)   Date Value   06/04/2018 61              Tobacco use:  Patient  reports that she quit smoking about 35 years ago. She has never used smokeless tobacco.    If Smoker - Cessation materials given? NA    Is Daily aspirin on medication list?:  Yes    Diabetic retinal exam done? Yes   If yes, document in Health Maintenance. Monofilament placed on counter? Yes    Shoes and socks removed? Yes    BP taken with correct size cuff? Yes   Repeated if > 130/80 NA     Is patient taking any medications for diabetes    Yes   If yes, see medication list    Is the patient reporting any side effects of diabetic medications? No    Microalbumin performed if applicable? No  Last done 10/21/14    Is patient taking any over the counter medications    Yes   If yes, see medication list    SUBJECTIVE:  Vickey Velarde is a 80 y.o. female who presents for evaluation ofType 2 diabetes, chronic back pain hypertension and hyperlipidemia. She indicates that she is feeling well and denies any symptoms referable to her elevated blood pressure. Specifically denies chest pain, palpitations, dyspnea, orthopnea, PND or  or neuro sx. No anorexia, , or leg cramps noted. Current medication regimen is as listed below. She denies any side effects of medication, and has been taking it regularly. Lab Results   Component Value Date    LDLCALC 61 06/04/2018       Patient's blood sugars have been running in the 90s at home. She does feel that it is a little too low. Her last A1c was 6.5. She is taking metformin 500 twice a day. Her biggest meal of the day is her evening meal. She denies hypoglycemia. She also has been noticing irregular heartbeat at times and shortness of breath when she is bending over and more swelling of her legs. NAD  Neck no bruit or JVD  S1 and S2 normal, no murmurs, clicks, gallops or rubs. Regular rate and rhythm, occasional early beats. Chest is clear; no wheezes or rales. Trace bilateral edema   Neuro alert, no CN or motor deficits  Psych nl mood, thought and judgement    ASSESSMENT:   Diagnosis Orders   1. Type 2 diabetes mellitus with diabetic peripheral angiopathy without gangrene, without long-term current use of insulin ([facility])  POCT glycosylated hemoglobin (Hb A1C)6.3%. Patient will change her metformin to 500 mg in the evening. At her next visit we may discontinue it altogether. 2. Essential hypertension  Stable    3. Pure hypercholesterolemia  Stable    4. Coronary artery disease involving native coronary artery of native heart without angina pectoris  We will call cardiology and see if we can move up her appointment         Plan:  1)  Medication: continue current medication regimen unchanged  2)  Recheck in 6 months, sooner should new symptoms or problems arise. 3) LLR reviewed    [place] received counseling on the following healthy behaviors: medication adherence         Discussed use, benefit, and side effects of prescribed medications. Barriers to medication compliance addressed. All patient questions answered. Pt voiced understanding.

## 2018-09-04 NOTE — PROGRESS NOTES
Vaccine Information Sheet, \"Influenza - Inactivated\"  given to [employer], or parent/legal guardian of  Gautam and verbalized understanding. Patient responses:    Have you ever had a reaction to a flu vaccine? No  Are you able to eat eggs without adverse effects? Yes  Do you have any current illness? No  Have you ever had [place] Syndrome? No    Flu vaccine given per order. Please see immunization tab.

## 2018-09-19 DIAGNOSIS — E11.42 DIABETIC POLYNEUROPATHY ASSOCIATED WITH TYPE 2 DIABETES MELLITUS (HCC): ICD-10-CM

## 2018-09-20 RX ORDER — GABAPENTIN 100 MG/1
CAPSULE ORAL
Qty: 60 CAPSULE | Refills: 1 | Status: SHIPPED | OUTPATIENT
Start: 2018-09-20 | End: 2018-11-27 | Stop reason: SDUPTHER

## 2018-11-27 ENCOUNTER — OFFICE VISIT (OUTPATIENT)
Dept: FAMILY MEDICINE CLINIC | Age: 83
End: 2018-11-27
Payer: MEDICARE

## 2018-11-27 VITALS
HEIGHT: 66 IN | BODY MASS INDEX: 26.68 KG/M2 | WEIGHT: 166 LBS | DIASTOLIC BLOOD PRESSURE: 64 MMHG | OXYGEN SATURATION: 95 % | HEART RATE: 76 BPM | SYSTOLIC BLOOD PRESSURE: 126 MMHG

## 2018-11-27 DIAGNOSIS — F34.1 DYSTHYMIA: ICD-10-CM

## 2018-11-27 DIAGNOSIS — N18.30 CKD (CHRONIC KIDNEY DISEASE) STAGE 3, GFR 30-59 ML/MIN (HCC): ICD-10-CM

## 2018-11-27 DIAGNOSIS — E11.51 TYPE 2 DIABETES MELLITUS WITH DIABETIC PERIPHERAL ANGIOPATHY WITHOUT GANGRENE, WITHOUT LONG-TERM CURRENT USE OF INSULIN (HCC): Primary | ICD-10-CM

## 2018-11-27 DIAGNOSIS — E11.42 DIABETIC POLYNEUROPATHY ASSOCIATED WITH TYPE 2 DIABETES MELLITUS (HCC): ICD-10-CM

## 2018-11-27 DIAGNOSIS — I10 ESSENTIAL HYPERTENSION: ICD-10-CM

## 2018-11-27 DIAGNOSIS — I25.10 CORONARY ARTERY DISEASE INVOLVING NATIVE CORONARY ARTERY OF NATIVE HEART WITHOUT ANGINA PECTORIS: ICD-10-CM

## 2018-11-27 DIAGNOSIS — E78.00 PURE HYPERCHOLESTEROLEMIA: ICD-10-CM

## 2018-11-27 LAB — HBA1C MFR BLD: 6.7 %

## 2018-11-27 PROCEDURE — 1101F PT FALLS ASSESS-DOCD LE1/YR: CPT | Performed by: FAMILY MEDICINE

## 2018-11-27 PROCEDURE — G8482 FLU IMMUNIZE ORDER/ADMIN: HCPCS | Performed by: FAMILY MEDICINE

## 2018-11-27 PROCEDURE — G8598 ASA/ANTIPLAT THER USED: HCPCS | Performed by: FAMILY MEDICINE

## 2018-11-27 PROCEDURE — 1090F PRES/ABSN URINE INCON ASSESS: CPT | Performed by: FAMILY MEDICINE

## 2018-11-27 PROCEDURE — G8427 DOCREV CUR MEDS BY ELIG CLIN: HCPCS | Performed by: FAMILY MEDICINE

## 2018-11-27 PROCEDURE — 99214 OFFICE O/P EST MOD 30 MIN: CPT | Performed by: FAMILY MEDICINE

## 2018-11-27 PROCEDURE — 1123F ACP DISCUSS/DSCN MKR DOCD: CPT | Performed by: FAMILY MEDICINE

## 2018-11-27 PROCEDURE — 4040F PNEUMOC VAC/ADMIN/RCVD: CPT | Performed by: FAMILY MEDICINE

## 2018-11-27 PROCEDURE — 1036F TOBACCO NON-USER: CPT | Performed by: FAMILY MEDICINE

## 2018-11-27 PROCEDURE — 83036 HEMOGLOBIN GLYCOSYLATED A1C: CPT | Performed by: FAMILY MEDICINE

## 2018-11-27 PROCEDURE — G8417 CALC BMI ABV UP PARAM F/U: HCPCS | Performed by: FAMILY MEDICINE

## 2018-11-27 RX ORDER — GABAPENTIN 100 MG/1
CAPSULE ORAL
Refills: 3 | COMMUNITY
Start: 2018-11-18 | End: 2019-02-26 | Stop reason: ALTCHOICE

## 2018-11-27 RX ORDER — SIMVASTATIN 40 MG
TABLET ORAL
Qty: 90 TABLET | Refills: 1 | Status: SHIPPED | OUTPATIENT
Start: 2018-11-27 | End: 2019-05-28 | Stop reason: SDUPTHER

## 2018-12-14 ENCOUNTER — TELEPHONE (OUTPATIENT)
Dept: FAMILY MEDICINE CLINIC | Age: 83
End: 2018-12-14

## 2018-12-14 RX ORDER — CITALOPRAM HYDROBROMIDE 10 MG/1
10 TABLET ORAL DAILY
Qty: 30 TABLET | Refills: 5 | Status: SHIPPED | OUTPATIENT
Start: 2018-12-14 | End: 2019-04-01 | Stop reason: SDUPTHER

## 2019-02-26 ENCOUNTER — OFFICE VISIT (OUTPATIENT)
Dept: FAMILY MEDICINE CLINIC | Age: 84
End: 2019-02-26
Payer: MEDICARE

## 2019-02-26 VITALS
HEART RATE: 70 BPM | OXYGEN SATURATION: 92 % | BODY MASS INDEX: 26.66 KG/M2 | WEIGHT: 165.2 LBS | DIASTOLIC BLOOD PRESSURE: 60 MMHG | SYSTOLIC BLOOD PRESSURE: 130 MMHG

## 2019-02-26 DIAGNOSIS — I10 ESSENTIAL HYPERTENSION: ICD-10-CM

## 2019-02-26 DIAGNOSIS — E11.51 TYPE 2 DIABETES MELLITUS WITH DIABETIC PERIPHERAL ANGIOPATHY WITHOUT GANGRENE, WITHOUT LONG-TERM CURRENT USE OF INSULIN (HCC): Primary | ICD-10-CM

## 2019-02-26 DIAGNOSIS — E78.00 PURE HYPERCHOLESTEROLEMIA: ICD-10-CM

## 2019-02-26 DIAGNOSIS — F34.1 DYSTHYMIA: ICD-10-CM

## 2019-02-26 DIAGNOSIS — N18.30 CKD (CHRONIC KIDNEY DISEASE) STAGE 3, GFR 30-59 ML/MIN (HCC): ICD-10-CM

## 2019-02-26 DIAGNOSIS — E11.42 DIABETIC POLYNEUROPATHY ASSOCIATED WITH TYPE 2 DIABETES MELLITUS (HCC): ICD-10-CM

## 2019-02-26 DIAGNOSIS — I25.10 CORONARY ARTERY DISEASE INVOLVING NATIVE CORONARY ARTERY OF NATIVE HEART WITHOUT ANGINA PECTORIS: ICD-10-CM

## 2019-02-26 LAB
A/G RATIO: 2.2 (ref 1.1–2.2)
ALBUMIN SERPL-MCNC: 4.1 G/DL (ref 3.4–5)
ALP BLD-CCNC: 35 U/L (ref 40–129)
ALT SERPL-CCNC: 15 U/L (ref 10–40)
ANION GAP SERPL CALCULATED.3IONS-SCNC: 15 MMOL/L (ref 3–16)
AST SERPL-CCNC: 11 U/L (ref 15–37)
BILIRUB SERPL-MCNC: 0.8 MG/DL (ref 0–1)
BUN BLDV-MCNC: 36 MG/DL (ref 7–20)
CALCIUM SERPL-MCNC: 9.4 MG/DL (ref 8.3–10.6)
CHLORIDE BLD-SCNC: 102 MMOL/L (ref 99–110)
CHOLESTEROL, TOTAL: 139 MG/DL (ref 0–199)
CO2: 23 MMOL/L (ref 21–32)
CREAT SERPL-MCNC: 1.3 MG/DL (ref 0.6–1.2)
GFR AFRICAN AMERICAN: 47
GFR NON-AFRICAN AMERICAN: 39
GLOBULIN: 1.9 G/DL
GLUCOSE BLD-MCNC: 131 MG/DL (ref 70–99)
HBA1C MFR BLD: 6.8 %
HDLC SERPL-MCNC: 69 MG/DL (ref 40–60)
LDL CHOLESTEROL CALCULATED: 43 MG/DL
POTASSIUM SERPL-SCNC: 4.4 MMOL/L (ref 3.5–5.1)
SODIUM BLD-SCNC: 140 MMOL/L (ref 136–145)
TOTAL PROTEIN: 6 G/DL (ref 6.4–8.2)
TRIGL SERPL-MCNC: 133 MG/DL (ref 0–150)
VLDLC SERPL CALC-MCNC: 27 MG/DL

## 2019-02-26 PROCEDURE — G8417 CALC BMI ABV UP PARAM F/U: HCPCS | Performed by: FAMILY MEDICINE

## 2019-02-26 PROCEDURE — 1101F PT FALLS ASSESS-DOCD LE1/YR: CPT | Performed by: FAMILY MEDICINE

## 2019-02-26 PROCEDURE — G8427 DOCREV CUR MEDS BY ELIG CLIN: HCPCS | Performed by: FAMILY MEDICINE

## 2019-02-26 PROCEDURE — 1090F PRES/ABSN URINE INCON ASSESS: CPT | Performed by: FAMILY MEDICINE

## 2019-02-26 PROCEDURE — 83036 HEMOGLOBIN GLYCOSYLATED A1C: CPT | Performed by: FAMILY MEDICINE

## 2019-02-26 PROCEDURE — G8598 ASA/ANTIPLAT THER USED: HCPCS | Performed by: FAMILY MEDICINE

## 2019-02-26 PROCEDURE — 99214 OFFICE O/P EST MOD 30 MIN: CPT | Performed by: FAMILY MEDICINE

## 2019-02-26 PROCEDURE — 36415 COLL VENOUS BLD VENIPUNCTURE: CPT | Performed by: FAMILY MEDICINE

## 2019-02-26 PROCEDURE — G8482 FLU IMMUNIZE ORDER/ADMIN: HCPCS | Performed by: FAMILY MEDICINE

## 2019-02-26 PROCEDURE — 1123F ACP DISCUSS/DSCN MKR DOCD: CPT | Performed by: FAMILY MEDICINE

## 2019-02-26 PROCEDURE — 4040F PNEUMOC VAC/ADMIN/RCVD: CPT | Performed by: FAMILY MEDICINE

## 2019-02-26 PROCEDURE — 1036F TOBACCO NON-USER: CPT | Performed by: FAMILY MEDICINE

## 2019-04-01 RX ORDER — CITALOPRAM 10 MG/1
TABLET ORAL
Qty: 30 TABLET | Refills: 4 | Status: SHIPPED | OUTPATIENT
Start: 2019-04-01 | End: 2019-08-12

## 2019-05-07 ENCOUNTER — TELEPHONE (OUTPATIENT)
Dept: FAMILY MEDICINE CLINIC | Age: 84
End: 2019-05-07

## 2019-05-13 ENCOUNTER — OFFICE VISIT (OUTPATIENT)
Dept: FAMILY MEDICINE CLINIC | Age: 84
End: 2019-05-13
Payer: MEDICARE

## 2019-05-13 VITALS
SYSTOLIC BLOOD PRESSURE: 122 MMHG | WEIGHT: 164.2 LBS | HEIGHT: 66 IN | BODY MASS INDEX: 26.39 KG/M2 | DIASTOLIC BLOOD PRESSURE: 60 MMHG | OXYGEN SATURATION: 98 % | HEART RATE: 69 BPM

## 2019-05-13 DIAGNOSIS — R07.89 ATYPICAL CHEST PAIN: Primary | ICD-10-CM

## 2019-05-13 DIAGNOSIS — N18.30 CKD (CHRONIC KIDNEY DISEASE) STAGE 3, GFR 30-59 ML/MIN (HCC): ICD-10-CM

## 2019-05-13 DIAGNOSIS — E11.51 TYPE 2 DIABETES MELLITUS WITH DIABETIC PERIPHERAL ANGIOPATHY WITHOUT GANGRENE, WITHOUT LONG-TERM CURRENT USE OF INSULIN (HCC): ICD-10-CM

## 2019-05-13 LAB
ANION GAP SERPL CALCULATED.3IONS-SCNC: 13 MMOL/L (ref 3–16)
BUN BLDV-MCNC: 45 MG/DL (ref 7–20)
CALCIUM SERPL-MCNC: 10 MG/DL (ref 8.3–10.6)
CHLORIDE BLD-SCNC: 107 MMOL/L (ref 99–110)
CO2: 24 MMOL/L (ref 21–32)
CREAT SERPL-MCNC: 1.5 MG/DL (ref 0.6–1.2)
GFR AFRICAN AMERICAN: 40
GFR NON-AFRICAN AMERICAN: 33
GLUCOSE BLD-MCNC: 107 MG/DL (ref 70–99)
POTASSIUM SERPL-SCNC: 4.7 MMOL/L (ref 3.5–5.1)
SODIUM BLD-SCNC: 144 MMOL/L (ref 136–145)

## 2019-05-13 PROCEDURE — 4040F PNEUMOC VAC/ADMIN/RCVD: CPT | Performed by: FAMILY MEDICINE

## 2019-05-13 PROCEDURE — G8427 DOCREV CUR MEDS BY ELIG CLIN: HCPCS | Performed by: FAMILY MEDICINE

## 2019-05-13 PROCEDURE — 1090F PRES/ABSN URINE INCON ASSESS: CPT | Performed by: FAMILY MEDICINE

## 2019-05-13 PROCEDURE — 1123F ACP DISCUSS/DSCN MKR DOCD: CPT | Performed by: FAMILY MEDICINE

## 2019-05-13 PROCEDURE — G8417 CALC BMI ABV UP PARAM F/U: HCPCS | Performed by: FAMILY MEDICINE

## 2019-05-13 PROCEDURE — 1036F TOBACCO NON-USER: CPT | Performed by: FAMILY MEDICINE

## 2019-05-13 PROCEDURE — 36415 COLL VENOUS BLD VENIPUNCTURE: CPT | Performed by: FAMILY MEDICINE

## 2019-05-13 PROCEDURE — 99214 OFFICE O/P EST MOD 30 MIN: CPT | Performed by: FAMILY MEDICINE

## 2019-05-13 PROCEDURE — G8598 ASA/ANTIPLAT THER USED: HCPCS | Performed by: FAMILY MEDICINE

## 2019-05-13 NOTE — PROGRESS NOTES
Post-Discharge Transitional Care Management Services      Deysi Orozco   YOB: 1930    Date of Visit:  5/13/2019    Allergies   Allergen Reactions    Erythromycin      Upset stomach      Penicillins Swelling     Outpatient Medications Marked as Taking for the 5/13/19 encounter (Office Visit) with Monica Matos MD   Medication Sig Dispense Refill    citalopram (CELEXA) 10 MG tablet TAKE 1 TABLET BY MOUTH EVERY DAY 30 tablet 4    simvastatin (ZOCOR) 40 MG tablet TAKE 1 TABLET BY MOUTH IN THE EVENING 90 tablet 1    metFORMIN (GLUCOPHAGE) 500 MG tablet TAKE 1 TABLET BY MOUTH in the AM with breakfast. 90 tablet 1    triamterene-hydrochlorothiazide (MAXZIDE-25) 37.5-25 MG per tablet TAKE 1 TABLET BY MOUTH EVERY DAY 90 tablet 3    fluticasone (FLONASE) 50 MCG/ACT nasal spray 2 sprays by Nasal route daily 1 Bottle 1    aspirin 81 MG tablet Take 81 mg by mouth daily      glucose blood VI test strips (FREESTYLE LITE) strip Pt test twice daily E11.9 100 strip 1    Compression Stockings MISC by Does not apply route 1 each 0    vitamin B-12 (CYANOCOBALAMIN) 250 MCG tablet Take 250 mcg by mouth daily      FREESTYLE LANCETS MISC TEST TWICE A DAY AS DIRECTED dx 250.00 100 each 3    ibuprofen (ADVIL;MOTRIN) 200 MG tablet Take 200 mg by mouth 2 times daily.  Multiple Vitamin (MULTIVITAMIN PO) Take  by mouth daily.  metoprolol (LOPRESSOR) 25 MG tablet 25 mg 2 times daily. Patient instructed to take beta blockers with a sip of water the morning of surgery per Dr. Jakub Boyce. Vitals:    05/13/19 1120 05/13/19 1204   BP: 120/60 122/60   Site: Left Upper Arm    Position: Sitting    Cuff Size: Small Adult    Pulse: 69    SpO2: 98%    Weight: 164 lb 3.2 oz (74.5 kg)    Height: 5' 6\" (1.676 m)      Body mass index is 26.5 kg/m².      Wt Readings from Last 3 Encounters:   05/13/19 164 lb 3.2 oz (74.5 kg)   02/26/19 165 lb 3.2 oz (74.9 kg)   11/27/18 166 lb (75.3 kg)     BP Readings from Last 3 Encounters:   05/13/19 122/60   02/26/19 130/60   11/27/18 126/64        Patient was admitted to [facility] from 5/7/19 to 5/8/19 for   Encounter Diagnoses   Name Primary?  Atypical chest pain Yes    CKD (chronic kidney disease) stage 3, GFR 30-59 ml/min ([facility])     Type 2 diabetes mellitus with diabetic peripheral angiopathy without gangrene, without long-term current use of insulin ([address]      . Inpatient course: Discharge summary reviewed- see chart. Patient had a low potassium #3.3, her BUN was 37. Current status: Improved, patient is back in her assisted living center. She is doing well. No further chest pain. Review of Systems:  A comprehensive review of systems was negative except for what was noted in the HPI.     Physical Exam:  General Appearance: alert and oriented to person, place and time, well developed and well- nourished, in no acute distress  Skin: warm and dry, no rash or erythema    Neck: supple and non-tender without mass,   Pulmonary/Chest: clear to auscultation bilaterally- no wheezes, rales or rhonchi, normal air movement, no respiratory distress  Cardiovascular: normal rate, regular rhythm, normal S1 and S2, no murmurs, rubs, clicks, or gallops, distal pulses intact, no carotid bruits    Extremities: no cyanosis, clubbing or edema    Neurologic: , no cranial nerve deficit, gait, coordination and speech normal        Assessment/Plan:  Deysi was seen today for follow-up from hospital.    Diagnoses and all orders for this visit:    Atypical chest pain, resolved patient will follow-up with cardiology in one month    CKD (chronic kidney disease) stage 3, GFR 30-59 ml/min ([facility])  -     Basic Metabolic Panel, reassess with labs    Type 2 diabetes mellitus with diabetic peripheral angiopathy without gangrene, without long-term current use of insulin ([address] if her renal function is still low, we'll consider discontinuing metformin          Diagnostic test results reviewed: inpatient labs, chest x-ray, nuclear study-myoview and echocardiogram    Patient risk of morbidity and mortality: moderate    Medical Decision Making: moderate complexity

## 2019-05-28 RX ORDER — TRIAMTERENE AND HYDROCHLOROTHIAZIDE 37.5; 25 MG/1; MG/1
TABLET ORAL
Qty: 90 TABLET | Refills: 3 | Status: SHIPPED | OUTPATIENT
Start: 2019-05-28 | End: 2019-08-12

## 2019-05-28 RX ORDER — SIMVASTATIN 40 MG
TABLET ORAL
Qty: 90 TABLET | Refills: 1 | Status: SHIPPED | OUTPATIENT
Start: 2019-05-28 | End: 2019-08-12 | Stop reason: SDUPTHER

## 2019-05-28 NOTE — TELEPHONE ENCOUNTER
Deysi Orozco is requesting refill(s) triamterene/hctz  Last OV 5/13/19 (pertaining to medication)  LR 6/18/18 (per medication requested)  Next office visit scheduled or attempted No   If no, reason:  Pt is not scheduled     May Alanizs is requesting refill(s) simvastatin  Last OV 5/13/19 (pertaining to medication)  LR 11/27/18 (per medication requested)  Next office visit scheduled or attempted No   If no, reason:  Pt is not scheduled

## 2019-05-31 NOTE — TELEPHONE ENCOUNTER
Per pharmacy Medicare Part B will only cover test strips at 1 strip a day unless patient is insulin dependant. Part D will not cover they need new instructions.      Please sign off on pended order of once daily

## 2019-06-25 ENCOUNTER — TELEPHONE (OUTPATIENT)
Dept: FAMILY MEDICINE CLINIC | Age: 84
End: 2019-06-25

## 2019-06-25 NOTE — TELEPHONE ENCOUNTER
Patient is calling to see if Dr. Brenda Plaza would put in an order to get a Urine test.  She feels like she is getting a UTI. She stays at the [address] and is on a walker. Was wondering if this order could be faxed to Katelyn the nurse @ [phone]. If you need to call Katelyn with any questions her phone number is. . [phone]. Please advise patient If this can be done today.

## 2019-06-26 ENCOUNTER — OFFICE VISIT (OUTPATIENT)
Dept: FAMILY MEDICINE CLINIC | Age: 84
End: 2019-06-26
Payer: MEDICARE

## 2019-06-26 VITALS
OXYGEN SATURATION: 97 % | DIASTOLIC BLOOD PRESSURE: 68 MMHG | HEIGHT: 66 IN | HEART RATE: 68 BPM | TEMPERATURE: 98.2 F | BODY MASS INDEX: 26.52 KG/M2 | RESPIRATION RATE: 14 BRPM | WEIGHT: 165 LBS | SYSTOLIC BLOOD PRESSURE: 122 MMHG

## 2019-06-26 DIAGNOSIS — R53.81 MALAISE AND FATIGUE: ICD-10-CM

## 2019-06-26 DIAGNOSIS — E11.51 TYPE 2 DIABETES MELLITUS WITH DIABETIC PERIPHERAL ANGIOPATHY WITHOUT GANGRENE, WITHOUT LONG-TERM CURRENT USE OF INSULIN (HCC): ICD-10-CM

## 2019-06-26 DIAGNOSIS — R30.0 DYSURIA: Primary | ICD-10-CM

## 2019-06-26 DIAGNOSIS — R53.83 MALAISE AND FATIGUE: ICD-10-CM

## 2019-06-26 LAB
ANION GAP SERPL CALCULATED.3IONS-SCNC: 14 MMOL/L (ref 3–16)
BASOPHILS ABSOLUTE: 0.1 K/UL (ref 0–0.2)
BASOPHILS RELATIVE PERCENT: 1.1 %
BILIRUBIN, POC: NEGATIVE
BLOOD URINE, POC: NEGATIVE
BUN BLDV-MCNC: 38 MG/DL (ref 7–20)
CALCIUM SERPL-MCNC: 9.8 MG/DL (ref 8.3–10.6)
CHLORIDE BLD-SCNC: 106 MMOL/L (ref 99–110)
CHP ED QC CHECK: NORMAL
CLARITY, POC: NORMAL
CO2: 23 MMOL/L (ref 21–32)
COLOR, POC: NORMAL
CREAT SERPL-MCNC: 1.3 MG/DL (ref 0.6–1.2)
EOSINOPHILS ABSOLUTE: 0.2 K/UL (ref 0–0.6)
EOSINOPHILS RELATIVE PERCENT: 3.1 %
GFR AFRICAN AMERICAN: 47
GFR NON-AFRICAN AMERICAN: 39
GLUCOSE BLD-MCNC: 139 MG/DL
GLUCOSE BLD-MCNC: 142 MG/DL (ref 70–99)
GLUCOSE URINE, POC: 500
HBA1C MFR BLD: 7.5 %
HCT VFR BLD CALC: 40.1 % (ref 36–48)
HEMOGLOBIN: 13.3 G/DL (ref 12–16)
KETONES, POC: NEGATIVE
LEUKOCYTE EST, POC: NEGATIVE
LYMPHOCYTES ABSOLUTE: 1.3 K/UL (ref 1–5.1)
LYMPHOCYTES RELATIVE PERCENT: 19.9 %
MCH RBC QN AUTO: 30.2 PG (ref 26–34)
MCHC RBC AUTO-ENTMCNC: 33.3 G/DL (ref 31–36)
MCV RBC AUTO: 90.8 FL (ref 80–100)
MONOCYTES ABSOLUTE: 0.6 K/UL (ref 0–1.3)
MONOCYTES RELATIVE PERCENT: 9.4 %
NEUTROPHILS ABSOLUTE: 4.2 K/UL (ref 1.7–7.7)
NEUTROPHILS RELATIVE PERCENT: 66.5 %
NITRITE, POC: NEGATIVE
PDW BLD-RTO: 14.8 % (ref 12.4–15.4)
PH, POC: 5.5
PLATELET # BLD: 253 K/UL (ref 135–450)
PMV BLD AUTO: 7.9 FL (ref 5–10.5)
POTASSIUM SERPL-SCNC: 4.6 MMOL/L (ref 3.5–5.1)
PROTEIN, POC: NEGATIVE
RBC # BLD: 4.41 M/UL (ref 4–5.2)
SODIUM BLD-SCNC: 143 MMOL/L (ref 136–145)
SPECIFIC GRAVITY, POC: 1.03
UROBILINOGEN, POC: 0.2
WBC # BLD: 6.4 K/UL (ref 4–11)

## 2019-06-26 PROCEDURE — G8427 DOCREV CUR MEDS BY ELIG CLIN: HCPCS | Performed by: PHYSICIAN ASSISTANT

## 2019-06-26 PROCEDURE — 1036F TOBACCO NON-USER: CPT | Performed by: PHYSICIAN ASSISTANT

## 2019-06-26 PROCEDURE — G8598 ASA/ANTIPLAT THER USED: HCPCS | Performed by: PHYSICIAN ASSISTANT

## 2019-06-26 PROCEDURE — 4040F PNEUMOC VAC/ADMIN/RCVD: CPT | Performed by: PHYSICIAN ASSISTANT

## 2019-06-26 PROCEDURE — 99214 OFFICE O/P EST MOD 30 MIN: CPT | Performed by: PHYSICIAN ASSISTANT

## 2019-06-26 PROCEDURE — 83036 HEMOGLOBIN GLYCOSYLATED A1C: CPT | Performed by: PHYSICIAN ASSISTANT

## 2019-06-26 PROCEDURE — G8417 CALC BMI ABV UP PARAM F/U: HCPCS | Performed by: PHYSICIAN ASSISTANT

## 2019-06-26 PROCEDURE — 1090F PRES/ABSN URINE INCON ASSESS: CPT | Performed by: PHYSICIAN ASSISTANT

## 2019-06-26 PROCEDURE — 81002 URINALYSIS NONAUTO W/O SCOPE: CPT | Performed by: PHYSICIAN ASSISTANT

## 2019-06-26 PROCEDURE — 36415 COLL VENOUS BLD VENIPUNCTURE: CPT | Performed by: PHYSICIAN ASSISTANT

## 2019-06-26 PROCEDURE — 1123F ACP DISCUSS/DSCN MKR DOCD: CPT | Performed by: PHYSICIAN ASSISTANT

## 2019-06-26 PROCEDURE — 82962 GLUCOSE BLOOD TEST: CPT | Performed by: PHYSICIAN ASSISTANT

## 2019-06-26 NOTE — PROGRESS NOTES
[address] [address] 27 COMPLAINT  Chief Complaint   Patient presents with    Urinary Frequency     pt states she is having burning while she urinates. HISTORY OF PRESENT  ILLNESS  Vaughn Thompson is a 80 y.o.  female  Concerned about multiple issues over the past week. 1. 'having trouble urinating' : x 1 week, irritation after urination, dysuria, feels as though she cannot fully evacuate, urgency, and bilateral suprapubic pain. No fever or incontinence or hematuria. 2. Has a cold but now cough dry and hacking. 3. Jaw pain yesterday. This occurred with the chest pain a couple months ago. Some arm pain. Denies chest pain, new pedal edema, nausea, diaphoresis. Ok appetite. 4. Has diabetes and  off all meds. She denies adverse effects on the Metformin other than some  eletrolyte differences she states. Her home FBS max 170 and minimum 127. ROS    Remaining are reviewed and otherwise negative for other constitutional, neurologic, EENT, cardiac, pulmonary, GI, , musculoskeletal or extremity complaints. PAST MEDICAL/SURGICAL, SOCIAL, &  FAMILY HISTORY:  Reviewed and updated accordingly.      ALLERGIES :   Erythromycin and Penicillins    MEDICATIONS:  Current Outpatient Medications   Medication Sig Dispense Refill    blood glucose test strips (FREESTYLE LITE) strip Pt test once daily E11.9 100 strip 1    simvastatin (ZOCOR) 40 MG tablet TAKE 1 TABLET BY MOUTH EVERY DAY IN THE EVENING 90 tablet 1    triamterene-hydrochlorothiazide (MAXZIDE-25) 37.5-25 MG per tablet TAKE 1 TABLET BY MOUTH EVERY DAY 90 tablet 3    citalopram (CELEXA) 10 MG tablet TAKE 1 TABLET BY MOUTH EVERY DAY 30 tablet 4    fluticasone (FLONASE) 50 MCG/ACT nasal spray 2 sprays by Nasal route daily 1 Bottle 1    aspirin 81 MG tablet Take 81 mg by mouth daily      Compression Stockings MISC by Does not apply route 1 each 0    vitamin B-12 (CYANOCOBALAMIN) 250 MCG tablet Take 250 mcg by mouth daily  FREESTYLE LANCETS MISC TEST TWICE A DAY AS DIRECTED dx 250.00 100 each 3    ibuprofen (ADVIL;MOTRIN) 200 MG tablet Take 200 mg by mouth 2 times daily.  Multiple Vitamin (MULTIVITAMIN PO) Take  by mouth daily.  metoprolol (LOPRESSOR) 25 MG tablet 25 mg 2 times daily. Patient instructed to take beta blockers with a sip of water the morning of surgery per Dr. Shaniec Saleem. No current facility-administered medications for this visit. PHYSICAL EXAM     Vitals:    06/26/19 1026   BP: 122/68   Pulse: 68   Resp: 14   Temp: 98.2 °F (36.8 °C)   SpO2: 97%   Weight: 165 lb (74.8 kg)   Height: 5' 6\" (1.676 m)   Body mass index is 26.63 kg/m². APPEARANCE: Pleasant, friendly, well-nourished. No acute distress. Using walker. Cannot ascend to the exam table stating she does not climb steps. HEENT: Head: Normocephalic, atraumatic. Eyes: EOMI,PERRLA. Conjunctiva pink and moist. Sclera white. Ears: External ears uniform. Hearing aidest.  Nose:nares cyanontic, clear. Mouth: Oral mucosa moist. Throat is without erythema, exudates, edema. NECK: No lymphadenopathy or masses. Moves neck fully. HEART: Reg rate and rhythm. No murmurs, rubs, or gallops. LUNGS: Clear to auscultation. No wheezes, rales, or rhonchi. No cough   ABDOMEN:  Soft, bowel sounds present, non-tender, no masses or organomegaly. MUSCULOSKELETAL:  No clubbing, cyanosis or edema. NEUROLOGIC: Normal gross and sensory exam.  SKIN: Warm, dry, normal turgor. Cap refill <3secs. No rashes, petechiae, purpura. ADDITIONAL STUDIES     Pertinent laboratory results and/or imaging reviewed.    Orders Placed This Encounter   Procedures    CBC Auto Differential    Basic Metabolic Panel    POCT Urinalysis no Micro    POCT Glucose    POCT glycosylated hemoglobin (Hb A1C)     Results for POC orders placed in visit on 06/26/19   POCT glycosylated hemoglobin (Hb A1C)   Result Value Ref Range    Hemoglobin A1C 7.5 %     POCT Glucose   Result Value Ref Range    Glucose 139 mg/dL          POCT Urinalysis no Micro   Result Value Ref Range    Color, UA      Clarity, UA      Glucose, UA      Bilirubin, UA negative     Ketones, UA negative     Spec Grav, UA 1.030     Blood, UA POC negative     pH, UA 5.5     Protein, UA POC negative     Urobilinogen, UA 0.2     Leukocytes, UA negative     Nitrite, UA negative          IMPRESSION/ PLAN     1. Dysuria    2. Type 2 diabetes mellitus with diabetic peripheral angiopathy without gangrene, without long-term current use of insulin ([facility] [address]    3. Malaise and fatigue      Symptoms may be result of elevated blood sugars. Negative findings for Urinary or, cardiopulmonary issues  - check labs. If unchanged, will restart Metformin at 250vmg a day and f/u 2 weeks.

## 2019-06-27 DIAGNOSIS — E11.51 TYPE 2 DIABETES MELLITUS WITH DIABETIC PERIPHERAL ANGIOPATHY WITHOUT GANGRENE, WITHOUT LONG-TERM CURRENT USE OF INSULIN (HCC): Primary | ICD-10-CM

## 2019-06-27 NOTE — RESULT ENCOUNTER NOTE
Informed Ivana of normal labs and to start Metformin 250mg per day. F/u in 2-3 weeks or sooner if needed. Patient understands and agrees with plan. All questions were answered.

## 2019-06-27 NOTE — PROGRESS NOTES
Please notify Kenisha Dover that their lab results are essentially unchanged. After consulting with Dr Chelsi Toledo, restart Metformin but at 250mg once a day. Make follow up appointment after being on it for 2 weeks.

## 2019-08-12 ENCOUNTER — TELEPHONE (OUTPATIENT)
Dept: FAMILY MEDICINE CLINIC | Age: 84
End: 2019-08-12

## 2019-08-12 ENCOUNTER — OFFICE VISIT (OUTPATIENT)
Dept: FAMILY MEDICINE CLINIC | Age: 84
End: 2019-08-12
Payer: MEDICARE

## 2019-08-12 VITALS
HEIGHT: 66 IN | WEIGHT: 155 LBS | SYSTOLIC BLOOD PRESSURE: 122 MMHG | OXYGEN SATURATION: 98 % | DIASTOLIC BLOOD PRESSURE: 68 MMHG | HEART RATE: 80 BPM | BODY MASS INDEX: 24.91 KG/M2

## 2019-08-12 DIAGNOSIS — E78.00 PURE HYPERCHOLESTEROLEMIA: ICD-10-CM

## 2019-08-12 DIAGNOSIS — N18.30 CKD (CHRONIC KIDNEY DISEASE) STAGE 3, GFR 30-59 ML/MIN (HCC): ICD-10-CM

## 2019-08-12 DIAGNOSIS — I10 ESSENTIAL HYPERTENSION: ICD-10-CM

## 2019-08-12 DIAGNOSIS — I25.10 CORONARY ARTERY DISEASE INVOLVING NATIVE CORONARY ARTERY OF NATIVE HEART WITHOUT ANGINA PECTORIS: Primary | ICD-10-CM

## 2019-08-12 DIAGNOSIS — Z86.79 H/O SUPRAVENTRICULAR TACHYCARDIA: ICD-10-CM

## 2019-08-12 DIAGNOSIS — J43.2 CENTRILOBULAR EMPHYSEMA (HCC): ICD-10-CM

## 2019-08-12 PROCEDURE — 1036F TOBACCO NON-USER: CPT | Performed by: FAMILY MEDICINE

## 2019-08-12 PROCEDURE — 1090F PRES/ABSN URINE INCON ASSESS: CPT | Performed by: FAMILY MEDICINE

## 2019-08-12 PROCEDURE — G8417 CALC BMI ABV UP PARAM F/U: HCPCS | Performed by: FAMILY MEDICINE

## 2019-08-12 PROCEDURE — G8598 ASA/ANTIPLAT THER USED: HCPCS | Performed by: FAMILY MEDICINE

## 2019-08-12 PROCEDURE — 3023F SPIROM DOC REV: CPT | Performed by: FAMILY MEDICINE

## 2019-08-12 PROCEDURE — G8427 DOCREV CUR MEDS BY ELIG CLIN: HCPCS | Performed by: FAMILY MEDICINE

## 2019-08-12 PROCEDURE — 1123F ACP DISCUSS/DSCN MKR DOCD: CPT | Performed by: FAMILY MEDICINE

## 2019-08-12 PROCEDURE — G8926 SPIRO NO PERF OR DOC: HCPCS | Performed by: FAMILY MEDICINE

## 2019-08-12 PROCEDURE — 4040F PNEUMOC VAC/ADMIN/RCVD: CPT | Performed by: FAMILY MEDICINE

## 2019-08-12 PROCEDURE — 99215 OFFICE O/P EST HI 40 MIN: CPT | Performed by: FAMILY MEDICINE

## 2019-08-12 RX ORDER — PHENOL 1.4 %
1 AEROSOL, SPRAY (ML) MUCOUS MEMBRANE DAILY
COMMUNITY

## 2019-08-12 RX ORDER — HYDROCHLOROTHIAZIDE 25 MG/1
25 TABLET ORAL DAILY
Status: ON HOLD | COMMUNITY
End: 2021-10-27 | Stop reason: ALTCHOICE

## 2019-08-12 RX ORDER — ACETAMINOPHEN 325 MG/1
650 TABLET ORAL EVERY 6 HOURS PRN
COMMUNITY

## 2019-08-12 RX ORDER — NITROGLYCERIN 0.4 MG/1
0.4 TABLET SUBLINGUAL EVERY 5 MIN PRN
Status: ON HOLD | COMMUNITY
End: 2021-10-27

## 2019-08-12 RX ORDER — SIMVASTATIN 40 MG
TABLET ORAL
Qty: 90 TABLET | Refills: 1 | Status: ON HOLD | OUTPATIENT
Start: 2019-08-12 | End: 2021-10-27 | Stop reason: DRUGHIGH

## 2019-08-12 RX ORDER — ONDANSETRON 4 MG/1
4 TABLET, FILM COATED ORAL EVERY 8 HOURS PRN
Status: ON HOLD | COMMUNITY
End: 2021-10-27

## 2019-08-12 RX ORDER — CLOPIDOGREL BISULFATE 75 MG/1
75 TABLET ORAL
Status: ON HOLD | COMMUNITY
Start: 2019-07-19 | End: 2021-10-27 | Stop reason: ALTCHOICE

## 2019-08-12 RX ORDER — ATORVASTATIN CALCIUM 80 MG/1
80 TABLET, FILM COATED ORAL DAILY
COMMUNITY
End: 2019-08-12

## 2019-08-12 RX ORDER — FUROSEMIDE 40 MG/1
40 TABLET ORAL DAILY
COMMUNITY
Start: 2019-08-05

## 2019-08-12 NOTE — TELEPHONE ENCOUNTER
Lauryn Squires from [facility], called and stated that pt was having her blood drawn ,at [address], every Monday and Thursday. Before she left it changed to once a week. She is home now and has private duty aids coming twice a day. She also has a heart monitor. Pt is coming in to see Dr. Natalie Morales today. Lauryn Squires is wanting to know if pt is still needing her blood drawn twice a week? If no, how frequently? Does she need to follow up with cardiologist first? Please call Lauryn Squires to advise 21 [phone].

## 2019-08-13 ENCOUNTER — TELEPHONE (OUTPATIENT)
Dept: FAMILY MEDICINE CLINIC | Age: 84
End: 2019-08-13

## 2019-08-13 NOTE — TELEPHONE ENCOUNTER
I talked to the pharmacist at [facility], I let him know that dr Lizette Miller does the simvastatin and the metformin. All other medications would have to come from the cardiologist. I let him know which pharmacy she was using here and her cardiologist name and number.

## 2019-08-14 ENCOUNTER — TELEPHONE (OUTPATIENT)
Dept: FAMILY MEDICINE CLINIC | Age: 84
End: 2019-08-14

## 2019-08-15 ENCOUNTER — TELEPHONE (OUTPATIENT)
Dept: FAMILY MEDICINE CLINIC | Age: 84
End: 2019-08-15

## 2019-08-16 ENCOUNTER — TELEPHONE (OUTPATIENT)
Dept: FAMILY MEDICINE CLINIC | Age: 84
End: 2019-08-16

## 2019-08-16 NOTE — TELEPHONE ENCOUNTER
Shukri Land would like [facility] with Dr. Song Rebolledo please call her back regarding this patient.

## 2019-08-16 NOTE — TELEPHONE ENCOUNTER
Yoli Canada stated that Cardiology would only sign cardiology orders. I told her that when Dr. Andie James saw her on 8/12 and was asked about her UTI, she had said she was feeling much better. Yoli Canada stated she saw the patient two days ago 8/14 and Yoli Canada asked the patient how her symptoms were and patient said she was fine. Patient stated that the symptoms came over her all at once yesterday and it was worse over night.

## 2019-08-23 NOTE — TELEPHONE ENCOUNTER
I called Yoli Canada with [facility] and advised her that we received a BMP that was ordered by the pt's cardiologist. She states \"It doesn't really matter because they were faxed to the cardiologist as well. I advised her that it does in fact matter because the cardiologist is the one who has been changing her medications, not our office. She gave me the phone number to Katelyn at [facility] and I will call her to let her know that she needs to be more mindful of which physician she is sending lab results to because when I called [facility], I spoke with Desi Roldan who stated that they had NOT received any lab results. I faxed the results to Mikael Babcock at [facility] at [phone]. I called Katelyn and she stated that she had faxed it the [facility]. I advised her that they did not receive them and that I had faxed them to Mikael Babcock. She states that she faxed them the same number I did. She will call their to find out what changes, if any, are needed on the pt's medications.

## 2019-10-29 ENCOUNTER — CARE COORDINATION (OUTPATIENT)
Dept: CARE COORDINATION | Age: 84
End: 2019-10-29

## 2019-11-05 ENCOUNTER — HOSPITAL ENCOUNTER (OUTPATIENT)
Dept: GENERAL RADIOLOGY | Age: 84
Discharge: HOME | End: 2019-11-05
Payer: MEDICARE

## 2019-11-05 DIAGNOSIS — R13.10 DYSPHAGIA, UNSPECIFIED TYPE: ICD-10-CM

## 2019-11-05 PROCEDURE — 74220 X-RAY XM ESOPHAGUS 1CNTRST: CPT

## 2020-07-27 LAB — DIABETIC RETINOPATHY: NEGATIVE

## 2020-12-01 ENCOUNTER — APPOINTMENT (OUTPATIENT)
Dept: GENERAL RADIOLOGY | Age: 85
End: 2020-12-01
Payer: MEDICARE

## 2020-12-01 ENCOUNTER — APPOINTMENT (OUTPATIENT)
Dept: CT IMAGING | Age: 85
End: 2020-12-01
Payer: MEDICARE

## 2020-12-01 ENCOUNTER — HOSPITAL ENCOUNTER (EMERGENCY)
Age: 85
Discharge: HOME | End: 2020-12-01
Attending: EMERGENCY MEDICINE
Payer: MEDICARE

## 2020-12-01 VITALS
OXYGEN SATURATION: 95 % | HEIGHT: 66 IN | BODY MASS INDEX: 25.07 KG/M2 | WEIGHT: 156 LBS | RESPIRATION RATE: 18 BRPM | SYSTOLIC BLOOD PRESSURE: 168 MMHG | HEART RATE: 74 BPM | TEMPERATURE: 98.3 F | DIASTOLIC BLOOD PRESSURE: 73 MMHG

## 2020-12-01 PROCEDURE — 73562 X-RAY EXAM OF KNEE 3: CPT

## 2020-12-01 PROCEDURE — 70450 CT HEAD/BRAIN W/O DYE: CPT

## 2020-12-01 PROCEDURE — 72125 CT NECK SPINE W/O DYE: CPT

## 2020-12-01 PROCEDURE — 71250 CT THORAX DX C-: CPT

## 2020-12-01 PROCEDURE — 99283 EMERGENCY DEPT VISIT LOW MDM: CPT

## 2020-12-01 PROCEDURE — 73030 X-RAY EXAM OF SHOULDER: CPT

## 2020-12-01 ASSESSMENT — ENCOUNTER SYMPTOMS
NAUSEA: 0
ABDOMINAL PAIN: 0
EYE REDNESS: 0
SHORTNESS OF BREATH: 0
VOMITING: 0
BACK PAIN: 0

## 2020-12-01 NOTE — ED PROVIDER NOTES
1 [facility]  EMERGENCY DEPARTMENT ENCOUNTER          PHYSICIAN ASSISTANT NOTE       Date of evaluation: 12/1/2020    Chief Complaint     Fall      History of Present Illness     Emely Dennis is a 80 y.o. female with PMH of Diabetes, CAD on Plavix and ASA, HTN, HLD who presents after a Fall. Patient states that she was walking out of her doctor's office this afternoon with her walker when her walker collapsed, causing her to fall forwards and onto her left side. She states that she struck the back of her head on the door frame. She denies loss of consciousness. She is on Plavix and aspirin. She complains of pain and swelling to the posterior scalp as well as above the left knee. She also complains of pain to the right shoulder but notes that she does have history of rotator cuff injury to the shoulder. She states that she was able to get up after the fall and she was taken home by her . At her nursing facility, she was evaluated by a nurse who recommended she be evaluated and EMS were called. Patient denies any vision changes, dizziness, nausea or vomiting, neck pain, back pain, chest pain or shortness of breath, abdominal pain, focal numbness or weakness. Review of Systems     Review of Systems   Constitutional: Negative for chills and fever. HENT: Negative for congestion. Eyes: Negative for redness. Respiratory: Negative for shortness of breath. Cardiovascular: Negative for chest pain. Gastrointestinal: Negative for abdominal pain, nausea and vomiting. Genitourinary: Negative for difficulty urinating. Musculoskeletal: Negative for back pain and neck pain. Skin: Negative for wound. Neurological: Positive for headaches. Negative for dizziness, facial asymmetry, weakness and numbness. Psychiatric/Behavioral: The patient is not nervous/anxious.         Past Medical, Surgical, Family, and Social History     She has a past medical history of Diabetic neuropathy ([facility] [address] Gastritis, Hearing loss, Hyperlipidemia, Hypertension, MI (myocardial infarction) ([facility] [address] OA (osteoarthritis), S/P colonoscopy, Screening mammogram, and Type II or unspecified type diabetes mellitus without mention of complication, not stated as uncontrolled. She has a past surgical history that includes angioplasty (1986); electrocardiogram (2/1985); electrocardiogram (5/1995); Cataract removal with implant (Right, [id number]); and eye surgery (Left, 10/13). Her family history includes Cancer in her brother and mother; Heart Disease in her father and sister. She reports that she quit smoking about 37 years ago. She has never used smokeless tobacco. She reports current alcohol use of about 1.0 standard drinks of alcohol per week. She reports that she does not use drugs. Medications     Previous Medications    ACETAMINOPHEN (TYLENOL) 325 MG TABLET    Take 650 mg by mouth every 6 hours as needed for Pain    ASPIRIN 81 MG TABLET    Take 81 mg by mouth daily    BLOOD GLUCOSE TEST STRIPS (FREESTYLE LITE) STRIP    Pt test once daily E11.9    CALCIUM CARBONATE 600 MG TABS TABLET    Take 1 tablet by mouth daily Indications: Patient takes 200mg as needed    CLOPIDOGREL (PLAVIX) 75 MG TABLET    Take 75 mg by mouth    COMPRESSION STOCKINGS MISC    by Does not apply route    FLUTICASONE (FLONASE) 50 MCG/ACT NASAL SPRAY    2 sprays by Nasal route daily    FREESTYLE LANCETS MISC    TEST TWICE A DAY AS DIRECTED dx 250.00    FUROSEMIDE (LASIX) 20 MG TABLET    Take 20 mg by mouth    HYDROCHLOROTHIAZIDE (HYDRODIURIL) 25 MG TABLET    Take 25 mg by mouth daily    METFORMIN (GLUCOPHAGE) 500 MG TABLET    TAKE HALF OF A TABLET ( 250 MG) BY MOUTH IN THE AM WITH BREAKFAST. METOPROLOL (LOPRESSOR) 25 MG TABLET    12.5 mg 2 times daily Patient instructed to take beta blockers with a sip of water the morning of surgery per Dr. Nery Sherman MULTIPLE VITAMIN (MULTIVITAMIN PO)    Take  by mouth daily.     NITROGLYCERIN (NITROSTAT) 0.4 MG SL TABLET    Place 0.4 mg under the tongue every 5 minutes as needed for Chest pain up to max of 3 total doses. If no relief after 1 dose, call 911. ONDANSETRON (ZOFRAN) 4 MG TABLET    Take 4 mg by mouth every 8 hours as needed for Nausea or Vomiting    SIMVASTATIN (ZOCOR) 40 MG TABLET    TAKE 1 TABLET BY MOUTH EVERY DAY IN THE EVENING    UMECLIDINIUM-VILANTEROL (ANORO ELLIPTA) 62.5-25 MCG/INH AEPB INHALER    Inhale 1 puff into the lungs daily       Allergies     She is allergic to erythromycin and penicillins. Physical Exam     INITIAL VITALS: BP: (!) 168/73,Temp: 98.3 °F (36.8 °C), Pulse: 74, Resp: 18, SpO2: 95 %   Physical Exam  Vitals signs and nursing note reviewed. Constitutional:       General: She is not in acute distress. HENT:      Head: Normocephalic. Contusion present. Right Ear: No hemotympanum. Left Ear: No hemotympanum. Mouth/Throat:      Mouth: Mucous membranes are moist.      Pharynx: Oropharynx is clear. Eyes:      Extraocular Movements: Extraocular movements intact. Conjunctiva/sclera: Conjunctivae normal.      Pupils: Pupils are equal, round, and reactive to light. Neck:      Musculoskeletal: Neck supple. Muscular tenderness present. No spinous process tenderness. Cardiovascular:      Rate and Rhythm: Normal rate and regular rhythm. Pulmonary:      Effort: Pulmonary effort is normal. No respiratory distress. Breath sounds: Normal breath sounds. No wheezing, rhonchi or rales. Abdominal:      General: Bowel sounds are normal. There is no distension. Palpations: Abdomen is soft. Tenderness: There is no abdominal tenderness. There is no guarding or rebound. Musculoskeletal:         General: No deformity. Right shoulder: She exhibits decreased range of motion (chronic) and pain. She exhibits no tenderness, no bony tenderness, no swelling, no deformity and normal strength.       Left knee: She exhibits normal range of motion, no swelling, no the fall and ambulate. She complains of contusion to the back of her head, swelling to her left knee, and right shoulder pain. She denies any vision changes, dizziness, nausea or vomiting, neck or back pain, chest or shortness of breath, numbness or tingling. Physical exam reveals a pleasant 15-year-old female in no acute distress. She is hypertensive with otherwise normal vital signs. She is alert and oriented. Neurologically intact. No midline C, T, L-spine tenderness palpation. Heart rhythm and rate are regular. Lungs clear to auscultation. Abdomen soft and nontender. There is mild tenderness on palpation of the right anterior shoulder without any focal bony tenderness. No deformity. Extremity neurovascularly intact. There is a hematoma noted to the distal lateral left thigh just superior to the left knee. CT head and CT cervical spine without contrast were obtained, which revealed left posterior scalp contusion without intrcranial hemorrhage or skull fracture. No C spine fracture. X-ray of the left knee revealed no acute findings. X-ray of the right shoulder revealed no acute fracture. However, it did reveal 4cm nodular density in the right mid-lung. Will plan to add on CT chest.    At this time, I am going off service. Further imaging is pending. Oncoming provider will follow up test results, reassess and determine ultimate disposition. This patient was also evaluated by the attending physician. All care plans were discussed and agreed upon. Clinical Impression     1. Fall, initial encounter    2. Contusion of scalp, initial encounter    3.  Contusion of left thigh, initial encounter         Jan Samuels PA-C  12/01/20 9699

## 2020-12-01 NOTE — ED PROVIDER NOTES
ED Attending Attestation Note     Date of evaluation: 12/1/2020    This patient was seen by the advance practice provider. I have seen and examined the patient, agree with the workup, evaluation, management and diagnosis. The care plan has been discussed. My assessment reveals a 80year old female with a past medical history of HTN, HLD, CAD, CKD on ASA and clopidogrel who presented after a mechanical fall. On my evaluation she is well appearing in no acute distress. She has a GCS of 15. No signs of trauma to her head or neck. Her C-spine is non-tender on my exam.  Her abdomen is soft. She has a negative log roll bilaterally. Some ecchymosis to 3rd digit of her right hand but no tenderness and able to flex and  Extend all fingers. She has a contusion to the lateral aspect of her left knee. She is able to flex and extend the knee. She has intact PF/DF/EHL. SILT in left foot. +2 DP pulse.         Rita Khalil MD  12/01/20 3057

## 2020-12-02 NOTE — ED NOTES
Patient discharged to home with family. Patient verbalized understanding of discharge instructions. Advised of when to return to ED and when to call 911. Advised of follow up with PCP. No questions from patient to this RN.  Patient left ED without incident     Andrew Rodrigues RN  12/01/20 0547

## 2021-01-06 ENCOUNTER — TELEPHONE (OUTPATIENT)
Dept: CASE MANAGEMENT | Age: 86
End: 2021-01-06

## 2021-01-06 NOTE — TELEPHONE ENCOUNTER
Pulmonary nodule noted on CT Chest 12/01/2020-recommended 3 mo f/up CT. Called PCP's office-spoke to Cherelle-to make aware. Bam Salcedo will make note for Dr. Paras Cross to review. Patient's next appointment with PCP is scheduled for March.     Suni LAWN, RN  [facility]  Lung Nodule Navigator  [phone]

## 2021-10-18 ENCOUNTER — OFFICE VISIT (OUTPATIENT)
Dept: SURGERY | Age: 86
End: 2021-10-18
Payer: MEDICARE

## 2021-10-18 VITALS
WEIGHT: 162 LBS | DIASTOLIC BLOOD PRESSURE: 72 MMHG | BODY MASS INDEX: 27.66 KG/M2 | HEIGHT: 64 IN | TEMPERATURE: 97.5 F | SYSTOLIC BLOOD PRESSURE: 144 MMHG | HEART RATE: 95 BPM

## 2021-10-18 DIAGNOSIS — E11.69 TYPE 2 DIABETES MELLITUS WITH OTHER SPECIFIED COMPLICATION, UNSPECIFIED WHETHER LONG TERM INSULIN USE (HCC): ICD-10-CM

## 2021-10-18 DIAGNOSIS — M25.461 FLUID IN RIGHT KNEE JOINT: Primary | ICD-10-CM

## 2021-10-18 PROCEDURE — G8427 DOCREV CUR MEDS BY ELIG CLIN: HCPCS | Performed by: SURGERY

## 2021-10-18 PROCEDURE — G8417 CALC BMI ABV UP PARAM F/U: HCPCS | Performed by: SURGERY

## 2021-10-18 PROCEDURE — 99203 OFFICE O/P NEW LOW 30 MIN: CPT | Performed by: SURGERY

## 2021-10-18 PROCEDURE — 4040F PNEUMOC VAC/ADMIN/RCVD: CPT | Performed by: SURGERY

## 2021-10-18 PROCEDURE — G8484 FLU IMMUNIZE NO ADMIN: HCPCS | Performed by: SURGERY

## 2021-10-18 PROCEDURE — 1090F PRES/ABSN URINE INCON ASSESS: CPT | Performed by: SURGERY

## 2021-10-18 PROCEDURE — 1123F ACP DISCUSS/DSCN MKR DOCD: CPT | Performed by: SURGERY

## 2021-10-18 PROCEDURE — 1036F TOBACCO NON-USER: CPT | Performed by: SURGERY

## 2021-10-18 NOTE — PROGRESS NOTES
[facility]    CC: Right knee fluid    Referring Physician: Dr. Verna Dee [facility])    HPI: This is an 80 y. o.female with a PMHx as delineated below who presents to clinic in consultation for right knee swelling. She fell from standing . She noted edema without any wound. There was a significant amount of fluid noted and she was seen by Orthopedics at [facility] who performed multiple aspirations with return of fluid. She was referred to [facility] and subsequently referred to [facility] for evaluation. PMHx:   Past Medical History:   Diagnosis Date    [facility] neuropathy [facility] [address]     Gastritis     Hearing loss     bilateral hearing aids    Hyperlipidemia     Hypertension     MI (myocardial infarction) ([facility] [address] 1986    Angioplasty    OA (osteoarthritis)     S/P colonoscopy 2009    Screening mammogram 2009    Due 1 yr    Type II or unspecified type diabetes mellitus without mention of complication, not stated as uncontrolled    HgbA1c - PENDING    PSHx:   Past Surgical History:   Procedure Laterality Date    ANGIOPLASTY      CATARACT REMOVAL WITH IMPLANT Right [id number]    ELECTROCARDIOGRAM  1985    ELECTROCARDIOGRAM  1995    EYE SURGERY Left 10/13    phaco with iol     Allergy:   Allergies   Allergen Reactions    Erythromycin      Upset stomach      Penicillins Swelling       SHx:   Social History     Socioeconomic History    Marital status:      Spouse name: Not on file    Number of children: Not on file    Years of education: Not on file    Highest education level: Not on file   Occupational History    Not on file   Tobacco Use    Smoking status: Former Smoker     Quit date: 9/10/1983     Years since quittin.1    Smokeless tobacco: Never Used   Substance and Sexual Activity    Alcohol use:  Yes     Alcohol/week: 1.0 standard drinks     Types: 1 Glasses of wine per week     Comment: 1 x week    Drug use: No    Sexual activity: Not on file   Other Topics Concern    Not on file   Social History Narrative    Not on file     Social Determinants of Health     Financial Resource Strain:     Difficulty of Paying Living Expenses:    Food Insecurity:     Worried About Running Out of Food in the Last Year:     [address] in the Last Year:    Transportation Needs:     Lack of Transportation (Medical):  Lack of Transportation (Non-Medical):    Physical Activity:     Days of Exercise per Week:     Minutes of Exercise per Session:    Stress:     Feeling of Stress :    Social Connections:     Frequency of Communication with Friends and Family:     Frequency of Social Gatherings with Friends and Family:     Attends Jewish Services:     Active Member of Clubs or Organizations:     Attends Club or Organization Meetings:     Marital Status:    Intimate Partner Violence:     Fear of Current or Ex-Partner:     Emotionally Abused:     Physically Abused:     Sexually Abused:      FHx: Family history of skin CA: None    Meds:   Current Outpatient Medications   Medication Sig Dispense Refill    metFORMIN (GLUCOPHAGE) 500 MG tablet TAKE HALF OF A TABLET ( 250 MG) BY MOUTH IN THE AM WITH BREAKFAST. 90 tablet 0    clopidogrel (PLAVIX) 75 MG tablet Take 75 mg by mouth      furosemide (LASIX) 20 MG tablet Take 20 mg by mouth      hydrochlorothiazide (HYDRODIURIL) 25 MG tablet Take 25 mg by mouth daily      acetaminophen (TYLENOL) 325 MG tablet Take 650 mg by mouth every 6 hours as needed for Pain      calcium carbonate 600 MG TABS tablet Take 1 tablet by mouth daily Indications: Patient takes 200mg as needed      nitroGLYCERIN (NITROSTAT) 0.4 MG SL tablet Place 0.4 mg under the tongue every 5 minutes as needed for Chest pain up to max of 3 total doses. If no relief after 1 dose, call 911.       ondansetron (ZOFRAN) 4 MG tablet Take 4 mg by mouth every 8 hours as needed for Nausea or Vomiting      simvastatin (ZOCOR) 40 MG tablet TAKE 1 TABLET BY MOUTH EVERY DAY IN THE EVENING 90 tablet 1    umeclidinium-vilanterol (ANORO ELLIPTA) 62.5-25 MCG/INH AEPB inhaler Inhale 1 puff into the lungs daily 1 each 0    blood glucose test strips (FREESTYLE LITE) strip Pt test once daily E11.9 100 strip 1    fluticasone (FLONASE) 50 MCG/ACT nasal spray 2 sprays by Nasal route daily 1 Bottle 1    aspirin 81 MG tablet Take 81 mg by mouth daily      Compression Stockings MISC by Does not apply route 1 each 0    FREESTYLE LANCETS MISC TEST TWICE A DAY AS DIRECTED dx 250.00 100 each 3    Multiple Vitamin (MULTIVITAMIN PO) Take  by mouth daily.  metoprolol (LOPRESSOR) 25 MG tablet 12.5 mg 2 times daily Patient instructed to take beta blockers with a sip of water the morning of surgery per Dr. Jaimie De La Vega. No current facility-administered medications for this visit. ROS   Constitutional: Negative for chills and fever. HENT: Negative for congestion, facial swelling, and voice change. Eyes: Negative for photophobia and visual disturbance. Respiratory: Negative for apnea, cough, chest tightness and shortness of breath. Cardiovascular: Negative for chest pain and palpitations. Gastrointestinal: Negative for dysphagia and early satiety. Genitourinary: Negative for difficulty urinating, dysuria, flank pain, frequency and hematuria. Musculoskeletal: Negative for new gait problem, joint swelling and myalgias. Skin: Negative for color change, pallor and rash. Endocrine: negative for tremors, temperature intolerance or polydipsia. Allergic/Immunologic: Negative for new environmental or food allergies. Neurological: Negative for dizziness, seizures, speech difficulty, numbness. Hematological: Negative for adenopathy. Psychiatric/Behavioral: Negative for agitation and confusion.     EXAM    BP (!) 144/72   Pulse 95   Temp 97.5 °F (36.4 °C)   Ht 5' 4\" (1.626 m)   Wt 162 lb (73.5 kg)   BMI 27.81 kg/m²     GEN: NAD, pleasant, appears stated age  CVS: RRR  PULM: No respiratory distress  HEENT: PERRLA/EOMI; hearing appears within normal limits  NECK: Supple with trachea in midline, no masses  EXT: Large fluid collection on the medial aspect of right knee  ABD: soft/NT/ND  NEURO: No focal deficits, no obvious CN deficits  PSYCH: Mood appropriate    PATHOLOGY/WORKUP: None    IMP: 80 y. o.female with R knee fluid. PLAN: Will obtain US with aspiration and drain placement with IR (will additionally need cytology). May require sclerosis. If persistent, will then need MRI. Will need to hold Eliquis (previous DVT) prior to procedure. Surgical intervention only if soft tissue defect develops - given comorbid condition, would only perform as last resort. The patient was counseled at length about the risks of ольга Covid-19 during their perioperative period and any recovery window from their procedure. The patient was made aware that ольга Covid-19  may worsen their prognosis for recovering from their procedure  and lend to a higher morbidity and/or mortality risk. All material risks, benefits, and reasonable alternatives including postponing the procedure were discussed. The patient does wish to proceed with the procedure at this time.     Carla Blake MD  [address] [place] [address] [facility]  [phone]  10/18/21

## 2021-10-19 ENCOUNTER — TELEPHONE (OUTPATIENT)
Dept: SURGERY | Age: 86
End: 2021-10-19

## 2021-10-19 DIAGNOSIS — M25.461 FLUID IN RIGHT KNEE JOINT: Primary | ICD-10-CM

## 2021-10-19 LAB
ESTIMATED AVERAGE GLUCOSE: 145.6 MG/DL
HBA1C MFR BLD: 6.7 %

## 2021-10-19 NOTE — TELEPHONE ENCOUNTER
Patient seen yesterday with below plan:   IMP: 80 y. o.female with R knee fluid. PLAN: Will obtain US with aspiration and drain placement with IR (will additionally need cytology). May require sclerosis. If persistent, will then need MRI. Will need to hold Eliquis (previous DVT) prior to procedure. Surgical intervention only if soft tissue defect develops - given comorbid condition, would only perform as last resort. Called IR at [phone] to schedule testing.  VM left for the IR team.

## 2021-10-19 NOTE — TELEPHONE ENCOUNTER
Scheduled with IR on 10/20/21 at 2:30pm, 2pm arrival at [facility]. Called patient mobile number, VM not set up. Called home number and LM with details. Called Ida Brain, listed on HIPAA. LM with details. Requested a return call to office so that we can ensure they have details for appt.

## 2021-10-20 ENCOUNTER — HOSPITAL ENCOUNTER (OUTPATIENT)
Dept: CT IMAGING | Age: 86
Discharge: HOME | End: 2021-10-20
Payer: MEDICARE

## 2021-10-20 DIAGNOSIS — M25.461 FLUID IN RIGHT KNEE JOINT: ICD-10-CM

## 2021-10-20 PROCEDURE — 2500000003 HC RX 250 WO HCPCS: Performed by: RADIOLOGY

## 2021-10-20 PROCEDURE — 88305 TISSUE EXAM BY PATHOLOGIST: CPT

## 2021-10-20 PROCEDURE — 2709999900 CT DRAINAGE HEMATOMA/SEROMA/FLUID COLLECTION

## 2021-10-20 PROCEDURE — 88112 CYTOPATH CELL ENHANCE TECH: CPT

## 2021-10-20 RX ORDER — LIDOCAINE HYDROCHLORIDE 10 MG/ML
15 INJECTION, SOLUTION EPIDURAL; INFILTRATION; INTRACAUDAL; PERINEURAL ONCE
Status: COMPLETED | OUTPATIENT
Start: 2021-10-20 | End: 2021-10-20

## 2021-10-20 RX ADMIN — LIDOCAINE HYDROCHLORIDE 15 ML: 10 INJECTION, SOLUTION EPIDURAL; INFILTRATION; INTRACAUDAL; PERINEURAL at 13:41

## 2021-10-20 NOTE — PROGRESS NOTES
Pt here for drain placement to R Knee. Spoke with Dr. Mandi Noble prior to procedure. Consent obtained. Knee numbed with lidocaine, 8 Jordanian drain placed with 800mls dark red fluid out upon insertion. Drain sutured into place, reviewed discharge instructions and drain care with pt. Verbalized understanding. No further questions.

## 2021-10-21 ENCOUNTER — TELEPHONE (OUTPATIENT)
Dept: SURGERY | Age: 86
End: 2021-10-21

## 2021-10-21 NOTE — TELEPHONE ENCOUNTER
Patient had a CT guided drain placement yesterday with cytology. Lab is in process. Routing to MD to review and give appropriate plan.

## 2021-10-21 NOTE — TELEPHONE ENCOUNTER
Pt called in to ask about next steps following her procedure yesterday. Please call her back to discuss.

## 2021-10-22 ENCOUNTER — TELEPHONE (OUTPATIENT)
Dept: SURGERY | Age: 86
End: 2021-10-22

## 2021-10-22 NOTE — TELEPHONE ENCOUNTER
Darcy with [facility] called in needing to go over orders and the patient's plan of care    Please contact Adrian Brush at [phone]

## 2021-10-22 NOTE — TELEPHONE ENCOUNTER
[facility]    Will need to keep the drain in place until the output is <30cc/day. I will see her in 2 weeks. Thanks!   NK

## 2021-10-22 NOTE — TELEPHONE ENCOUNTER
[facility] staff called back to ask for an update on instructions and information for the pt about her drain. Information from the clinical team was relayed advising that the pt can shower and does not have a dressing to change. If she has a drain bag or bulb she can empty it into the toilet 2x per day. They also advised the pt would receive a call next week about antibiotics and to discuss when the drain would be removed. The pt had two additional questions; she would like to know when she can resume taking her blood thinners and when she can start wearing her JUNO hose.      Please call the pt back to discuss her additional concerns or with any new instructions provided by Dr. Damian Perry

## 2021-10-26 ENCOUNTER — HOSPITAL ENCOUNTER (INPATIENT)
Age: 86
LOS: 2 days | Discharge: HOME | DRG: 863 | End: 2021-10-28
Attending: EMERGENCY MEDICINE | Admitting: INTERNAL MEDICINE
Payer: MEDICARE

## 2021-10-26 ENCOUNTER — TELEPHONE (OUTPATIENT)
Dept: SURGERY | Age: 86
End: 2021-10-26

## 2021-10-26 ENCOUNTER — APPOINTMENT (OUTPATIENT)
Dept: GENERAL RADIOLOGY | Age: 86
DRG: 863 | End: 2021-10-26
Payer: MEDICARE

## 2021-10-26 DIAGNOSIS — B96.89 BACTEREMIA DUE TO ENTEROBACTER SPECIES: Primary | ICD-10-CM

## 2021-10-26 DIAGNOSIS — R78.81 BACTEREMIA DUE TO ENTEROBACTER SPECIES: Primary | ICD-10-CM

## 2021-10-26 LAB
ANION GAP SERPL CALCULATED.3IONS-SCNC: 16 MMOL/L (ref 3–16)
BASOPHILS ABSOLUTE: 0.1 K/UL (ref 0–0.2)
BASOPHILS RELATIVE PERCENT: 0.7 %
BILIRUBIN URINE: NEGATIVE
BLOOD, URINE: ABNORMAL
BUN BLDV-MCNC: 40 MG/DL (ref 7–20)
CALCIUM SERPL-MCNC: 9 MG/DL (ref 8.3–10.6)
CHLORIDE BLD-SCNC: 104 MMOL/L (ref 99–110)
CLARITY: CLEAR
CO2: 22 MMOL/L (ref 21–32)
COLOR: YELLOW
CREAT SERPL-MCNC: 1.8 MG/DL (ref 0.6–1.2)
EOSINOPHILS ABSOLUTE: 0 K/UL (ref 0–0.6)
EOSINOPHILS RELATIVE PERCENT: 0.1 %
GFR AFRICAN AMERICAN: 32
GFR NON-AFRICAN AMERICAN: 26
GLUCOSE BLD-MCNC: 136 MG/DL (ref 70–99)
GLUCOSE URINE: NEGATIVE MG/DL
HCT VFR BLD CALC: 32.5 % (ref 36–48)
HEMOGLOBIN: 10.6 G/DL (ref 12–16)
KETONES, URINE: ABNORMAL MG/DL
LEUKOCYTE ESTERASE, URINE: ABNORMAL
LYMPHOCYTES ABSOLUTE: 0.6 K/UL (ref 1–5.1)
LYMPHOCYTES RELATIVE PERCENT: 4.6 %
MAGNESIUM: 2 MG/DL (ref 1.8–2.4)
MCH RBC QN AUTO: 28.7 PG (ref 26–34)
MCHC RBC AUTO-ENTMCNC: 32.5 G/DL (ref 31–36)
MCV RBC AUTO: 88.4 FL (ref 80–100)
MICROSCOPIC EXAMINATION: YES
MONOCYTES ABSOLUTE: 1.3 K/UL (ref 0–1.3)
MONOCYTES RELATIVE PERCENT: 9.9 %
NEUTROPHILS ABSOLUTE: 11.3 K/UL (ref 1.7–7.7)
NEUTROPHILS RELATIVE PERCENT: 84.7 %
NITRITE, URINE: NEGATIVE
PDW BLD-RTO: 15.5 % (ref 12.4–15.4)
PH UA: 6 (ref 5–8)
PLATELET # BLD: 231 K/UL (ref 135–450)
PMV BLD AUTO: 7.7 FL (ref 5–10.5)
POTASSIUM REFLEX MAGNESIUM: 3.5 MMOL/L (ref 3.5–5.1)
PROTEIN UA: 30 MG/DL
RBC # BLD: 3.68 M/UL (ref 4–5.2)
RBC UA: ABNORMAL /HPF (ref 0–4)
SODIUM BLD-SCNC: 142 MMOL/L (ref 136–145)
SPECIFIC GRAVITY UA: 1.02 (ref 1–1.03)
URINE TYPE: ABNORMAL
UROBILINOGEN, URINE: 0.2 E.U./DL
WBC # BLD: 13.3 K/UL (ref 4–11)
WBC UA: ABNORMAL /HPF (ref 0–5)

## 2021-10-26 PROCEDURE — 80048 BASIC METABOLIC PNL TOTAL CA: CPT

## 2021-10-26 PROCEDURE — 83735 ASSAY OF MAGNESIUM: CPT

## 2021-10-26 PROCEDURE — 85025 COMPLETE CBC W/AUTO DIFF WBC: CPT

## 2021-10-26 PROCEDURE — 36415 COLL VENOUS BLD VENIPUNCTURE: CPT

## 2021-10-26 PROCEDURE — 81001 URINALYSIS AUTO W/SCOPE: CPT

## 2021-10-26 PROCEDURE — 1200000000 HC SEMI PRIVATE

## 2021-10-26 PROCEDURE — 99284 EMERGENCY DEPT VISIT MOD MDM: CPT

## 2021-10-26 PROCEDURE — 2580000003 HC RX 258: Performed by: STUDENT IN AN ORGANIZED HEALTH CARE EDUCATION/TRAINING PROGRAM

## 2021-10-26 PROCEDURE — 73560 X-RAY EXAM OF KNEE 1 OR 2: CPT

## 2021-10-26 PROCEDURE — 6360000002 HC RX W HCPCS: Performed by: STUDENT IN AN ORGANIZED HEALTH CARE EDUCATION/TRAINING PROGRAM

## 2021-10-26 PROCEDURE — 87040 BLOOD CULTURE FOR BACTERIA: CPT

## 2021-10-26 RX ORDER — CHOLECALCIFEROL (VITAMIN D3) 125 MCG
500 CAPSULE ORAL DAILY
COMMUNITY

## 2021-10-26 RX ADMIN — CEFEPIME HYDROCHLORIDE 2000 MG: 2 INJECTION, POWDER, FOR SOLUTION INTRAVENOUS at 20:23

## 2021-10-26 ASSESSMENT — PAIN DESCRIPTION - FREQUENCY: FREQUENCY: CONTINUOUS

## 2021-10-26 ASSESSMENT — PAIN DESCRIPTION - LOCATION: LOCATION: KNEE

## 2021-10-26 ASSESSMENT — ENCOUNTER SYMPTOMS
COUGH: 0
NAUSEA: 0
CONSTIPATION: 0
VOMITING: 0
SHORTNESS OF BREATH: 0
DIARRHEA: 1

## 2021-10-26 ASSESSMENT — PAIN DESCRIPTION - DESCRIPTORS: DESCRIPTORS: ACHING

## 2021-10-26 ASSESSMENT — PAIN SCALES - GENERAL: PAINLEVEL_OUTOF10: 2

## 2021-10-26 ASSESSMENT — PAIN DESCRIPTION - PAIN TYPE: TYPE: ACUTE PAIN

## 2021-10-26 ASSESSMENT — PAIN DESCRIPTION - ORIENTATION: ORIENTATION: RIGHT

## 2021-10-26 NOTE — ED PROVIDER NOTES
[facility]  EMERGENCY DEPARTMENT ENCOUNTER          [facility] RESIDENT NOTE       Date of evaluation: 10/26/2021    Chief Complaint     Abnormal Lab (WAS SEEN AT [facility] SUNDAY NIGHT FOR FEVERS/CHILLS. HAD BLOOD DRAWN AND WAS TOLD TODAY TO GO TO ER FOR \"BACTERIA IN BLOOD\". HAD KNEE PROCEDURE IN SEPTEMBER.)    History of Present Illness     Patrice Swenson is a 80 y.o. female w/ PMHx CAD, CKD, T2DM, COPD p/w bacteremia. Patient was seen in the ED at [facility] yesterday for pain and swelling in her right leg. Patient reports that she bumped her right knee against her walker during the summer and started having significant swelling requiring drainage. She developed a hematoma and had a drain placed by IR last week. Since the drain was placed the patient started developing redness and warmth in her knee and right. She presented to [facility] ED. Duplex revealed no evidence of DVT. She was discharged on cefdinir for presumed cellulitis, reported having diarrhea with it. However today, she was told to come to the hospital as her Blood Culture returned positive for strep and enterobacter. Review of Systems     Review of Systems   Constitutional: Negative for chills and fever. Respiratory: Negative for cough and shortness of breath. Cardiovascular: Positive for leg swelling. Negative for chest pain and palpitations. Gastrointestinal: Positive for diarrhea. Negative for constipation, nausea and vomiting. Musculoskeletal: Positive for joint swelling. Past Medical, Surgical, Family, and Social History     She has a past medical history of Diabetic neuropathy ([address] Gastritis, Hearing loss, Hyperlipidemia, Hypertension, MI (myocardial infarction) ([address] OA (osteoarthritis), S/P colonoscopy, Screening mammogram, and Type II or unspecified type diabetes mellitus without mention of complication, not stated as uncontrolled.   She has a past surgical history that includes angioplasty (1986); electrocardiogram (2/1985); electrocardiogram (5/1995); Cataract removal with implant (Right, [id number]); and eye surgery (Left, 10/13). Her family history includes Cancer in her brother and mother; Heart Disease in her father and sister. She reports that she quit smoking about 38 years ago. She has never used smokeless tobacco. She reports current alcohol use of about 1.0 standard drinks of alcohol per week. She reports that she does not use drugs. Medications     Previous Medications    ACETAMINOPHEN (TYLENOL) 325 MG TABLET    Take 650 mg by mouth every 6 hours as needed for Pain    ASPIRIN 81 MG TABLET    Take 81 mg by mouth daily    BLOOD GLUCOSE TEST STRIPS (FREESTYLE LITE) STRIP    Pt test once daily E11.9    CALCIUM CARBONATE 600 MG TABS TABLET    Take 1 tablet by mouth daily Indications: Patient takes 200mg as needed    CHOLECALCIFEROL (VITAMIN D3) 125 MCG (5000 UT) TABS    Take 10,000 Units by mouth    CLOPIDOGREL (PLAVIX) 75 MG TABLET    Take 75 mg by mouth    COMPRESSION STOCKINGS MISC    by Does not apply route    FLUTICASONE (FLONASE) 50 MCG/ACT NASAL SPRAY    2 sprays by Nasal route daily    FREESTYLE LANCETS MISC    TEST TWICE A DAY AS DIRECTED dx 250.00    FUROSEMIDE (LASIX) 20 MG TABLET    Take 20 mg by mouth    HYDROCHLOROTHIAZIDE (HYDRODIURIL) 25 MG TABLET    Take 25 mg by mouth daily    METFORMIN (GLUCOPHAGE) 500 MG TABLET    TAKE HALF OF A TABLET ( 250 MG) BY MOUTH IN THE AM WITH BREAKFAST. METOPROLOL (LOPRESSOR) 25 MG TABLET    12.5 mg 2 times daily Patient instructed to take beta blockers with a sip of water the morning of surgery per Dr. Tani Godinez. MULTIPLE VITAMIN (MULTIVITAMIN PO)    Take  by mouth daily. NITROGLYCERIN (NITROSTAT) 0.4 MG SL TABLET    Place 0.4 mg under the tongue every 5 minutes as needed for Chest pain up to max of 3 total doses. If no relief after 1 dose, call 911.     ONDANSETRON (ZOFRAN) 4 MG TABLET    Take 4 mg by mouth every 8 hours as needed for Nausea or Vomiting    SIMVASTATIN (ZOCOR) 40 MG TABLET    TAKE 1 TABLET BY MOUTH EVERY DAY IN THE EVENING    UMECLIDINIUM-VILANTEROL (ANORO ELLIPTA) 62.5-25 MCG/INH AEPB INHALER    Inhale 1 puff into the lungs daily    VITAMIN B-12 (CYANOCOBALAMIN) 500 MCG TABLET    Take 500 mcg by mouth daily       Allergies     She is allergic to erythromycin and penicillins. Physical Exam     INITIAL VITALS: BP: (!) 143/50, Temp: 98 °F (36.7 °C), Pulse: 80, Resp: 18, SpO2: 98 %   Physical Exam  Constitutional:       Appearance: Normal appearance. HENT:      Head: Normocephalic. Eyes:      Extraocular Movements: Extraocular movements intact. Pupils: Pupils are equal, round, and reactive to light. Cardiovascular:      Rate and Rhythm: Normal rate and regular rhythm. Pulses: Normal pulses. Heart sounds: Normal heart sounds. No murmur heard. No friction rub. No gallop. Pulmonary:      Effort: Pulmonary effort is normal.      Breath sounds: Rales (bibasilar) present. No wheezing or rhonchi. Abdominal:      Palpations: Abdomen is soft. Tenderness: There is no abdominal tenderness. There is no guarding or rebound. Musculoskeletal:         General: Swelling (right knee is 2x the size of the left) present. Right lower leg: Edema present. Left lower leg: Edema (1-2) present. Comments: Drain protruding from right knee. Minimal serosanguinous drainage. Skin:     General: Skin is warm and dry. Findings: Erythema (surrounding right knee) present. Neurological:      General: No focal deficit present. Mental Status: She is alert and oriented to person, place, and time. Diagnostic Results     RADIOLOGY:  XR KNEE RIGHT (1-2 VIEWS)   Final Result   Impression:   Pigtail drain and soft tissue swelling as above. No acute osseous abnormality.            LABS:   Results for orders placed or performed during the hospital encounter of 10/26/21   CBC Auto Differential   Result Value Ref Range WBC 13.3 (H) 4.0 - 11.0 K/uL    RBC 3.68 (L) 4.00 - 5.20 M/uL    Hemoglobin 10.6 (L) 12.0 - 16.0 g/dL    Hematocrit 32.5 (L) 36.0 - 48.0 %    MCV 88.4 80.0 - 100.0 fL    MCH 28.7 26.0 - 34.0 pg    MCHC 32.5 31.0 - 36.0 g/dL    RDW 15.5 (H) 12.4 - 15.4 %    Platelets 230 882 - 787 K/uL    MPV 7.7 5.0 - 10.5 fL    Neutrophils % 84.7 %    Lymphocytes % 4.6 %    Monocytes % 9.9 %    Eosinophils % 0.1 %    Basophils % 0.7 %    Neutrophils Absolute 11.3 (H) 1.7 - 7.7 K/uL    Lymphocytes Absolute 0.6 (L) 1.0 - 5.1 K/uL    Monocytes Absolute 1.3 0.0 - 1.3 K/uL    Eosinophils Absolute 0.0 0.0 - 0.6 K/uL    Basophils Absolute 0.1 0.0 - 0.2 K/uL   Basic Metabolic Panel w/ Reflex to MG   Result Value Ref Range    Sodium 142 136 - 145 mmol/L    Potassium reflex Magnesium 3.5 3.5 - 5.1 mmol/L    Chloride 104 99 - 110 mmol/L    CO2 22 21 - 32 mmol/L    Anion Gap 16 3 - 16    Glucose 136 (H) 70 - 99 mg/dL    BUN 40 (H) 7 - 20 mg/dL    CREATININE 1.8 (H) 0.6 - 1.2 mg/dL    GFR Non-African American 26 (A) >60    GFR  32 (A) >60    Calcium 9.0 8.3 - 10.6 mg/dL   Magnesium   Result Value Ref Range    Magnesium 2.00 1.80 - 2.40 mg/dL       RECENT VITALS:  BP: 125/82, Temp: 98 °F (36.7 °C),Pulse: 82, Resp: 17, SpO2: 93 %     Procedures       ED Course     Nursing Notes, Past Medical Hx, Past Surgical Hx, Social Hx, Allergies, and FamilyHx were reviewed. The patient was giventhe following medications:  Orders Placed This Encounter   Medications    cefepime (MAXIPIME) 2000 mg IVPB minibag     Order Specific Question:   Antimicrobial Indications     Answer:   Bloodstream Infection     Order Specific Question:   Suspected Organism(s)     Answer:   enterobacter     CONSULTS:  IP CONSULT TO HOSPITALIST    [address] [facility] / ESTELA / James Traore is a 80 y.o. female with recent IR placed right knee drain presenting with apparent Enterobacter bacteremia.   As noted above, the patient presented to [facility] ED yesterday and blood culture panel shows Enterobacter and strep in the blood. Possible that the strep is contaminant, but not the Enterobacter. Blood cultures ordered, Given the decreased drainage from the drain, obtained imaging of the knee which shows the pigtail catheter in the soft tissue of the knee. With apparent enterobacter bacteremia of unclear source, patient should be admitted for IV antibiotics pending infectious workup. This patient was also evaluated by the attending physician. All care plans were discussed and agreed upon. Clinical Impression     1.  Bacteremia due to Enterobacter species        Disposition     DISPOSITION    Admission     Deisi Marie MD  Resident  10/26/21 2016

## 2021-10-26 NOTE — ED PROVIDER NOTES
ED Attending Attestation Note     Date of evaluation: 10/26/2021    This patient was seen by the resident. I have seen and examined the patient, agree with the workup, evaluation, management and diagnosis. The care plan has been discussed. My assessment reveals patient with h/o knee infection s/p drain placement a last week who had PCR+ enterococcus and strep taken on blood draw yesterday at [facility]. Patient had cultures drawn and will be admitted for IV abx.      Jose Mccarthy MD  10/26/21 2008

## 2021-10-26 NOTE — TELEPHONE ENCOUNTER
Harini Garces with independent living called in to advise they got a call from [facility] to advise the pt's blood work showed bacteria in the blood. They will be sending her out by squad to [facility] for treatment.      Please contact German Gallegos at [phone] with any questions

## 2021-10-26 NOTE — TELEPHONE ENCOUNTER
Attempted to call patient twice, without success. Number states that number cant be completed. If patient is in pain, we can add to schedule.

## 2021-10-26 NOTE — TELEPHONE ENCOUNTER
Diana Nicole from [employer] is requesting a CB re the stent that was placed in pts R knee. Pt went to the ER over the wkend and was put on oral atb for infection in R knee. The nurse also states there is no drainage noted in the drain.     [phone]

## 2021-10-27 ENCOUNTER — APPOINTMENT (OUTPATIENT)
Dept: CT IMAGING | Age: 86
DRG: 863 | End: 2021-10-27
Payer: MEDICARE

## 2021-10-27 ENCOUNTER — APPOINTMENT (OUTPATIENT)
Dept: INTERVENTIONAL RADIOLOGY/VASCULAR | Age: 86
DRG: 863 | End: 2021-10-27
Payer: MEDICARE

## 2021-10-27 LAB
ANION GAP SERPL CALCULATED.3IONS-SCNC: 15 MMOL/L (ref 3–16)
BASOPHILS ABSOLUTE: 0 K/UL (ref 0–0.2)
BASOPHILS RELATIVE PERCENT: 0.3 %
BUN BLDV-MCNC: 35 MG/DL (ref 7–20)
CALCIUM SERPL-MCNC: 8.6 MG/DL (ref 8.3–10.6)
CHLORIDE BLD-SCNC: 106 MMOL/L (ref 99–110)
CO2: 22 MMOL/L (ref 21–32)
CREAT SERPL-MCNC: 1.6 MG/DL (ref 0.6–1.2)
EOSINOPHILS ABSOLUTE: 0 K/UL (ref 0–0.6)
EOSINOPHILS RELATIVE PERCENT: 0.2 %
GFR AFRICAN AMERICAN: 37
GFR NON-AFRICAN AMERICAN: 30
GLUCOSE BLD-MCNC: 111 MG/DL (ref 70–99)
GLUCOSE BLD-MCNC: 115 MG/DL (ref 70–99)
GLUCOSE BLD-MCNC: 126 MG/DL (ref 70–99)
GLUCOSE BLD-MCNC: 148 MG/DL (ref 70–99)
GLUCOSE BLD-MCNC: 151 MG/DL (ref 70–99)
GLUCOSE BLD-MCNC: 90 MG/DL (ref 70–99)
HCT VFR BLD CALC: 30.1 % (ref 36–48)
HEMOGLOBIN: 10.2 G/DL (ref 12–16)
INR BLD: 1.87 (ref 0.88–1.12)
LYMPHOCYTES ABSOLUTE: 0.6 K/UL (ref 1–5.1)
LYMPHOCYTES RELATIVE PERCENT: 5.3 %
MAGNESIUM: 1.9 MG/DL (ref 1.8–2.4)
MCH RBC QN AUTO: 29.7 PG (ref 26–34)
MCHC RBC AUTO-ENTMCNC: 33.8 G/DL (ref 31–36)
MCV RBC AUTO: 87.7 FL (ref 80–100)
MONOCYTES ABSOLUTE: 1 K/UL (ref 0–1.3)
MONOCYTES RELATIVE PERCENT: 9.8 %
NEUTROPHILS ABSOLUTE: 9 K/UL (ref 1.7–7.7)
NEUTROPHILS RELATIVE PERCENT: 84.4 %
PDW BLD-RTO: 15.5 % (ref 12.4–15.4)
PERFORMED ON: ABNORMAL
PERFORMED ON: NORMAL
PLATELET # BLD: 219 K/UL (ref 135–450)
PMV BLD AUTO: 7.9 FL (ref 5–10.5)
POTASSIUM REFLEX MAGNESIUM: 3.5 MMOL/L (ref 3.5–5.1)
PROTHROMBIN TIME: 21.7 SEC (ref 9.9–12.7)
RBC # BLD: 3.43 M/UL (ref 4–5.2)
SODIUM BLD-SCNC: 143 MMOL/L (ref 136–145)
WBC # BLD: 10.6 K/UL (ref 4–11)

## 2021-10-27 PROCEDURE — 99222 1ST HOSP IP/OBS MODERATE 55: CPT | Performed by: SURGERY

## 2021-10-27 PROCEDURE — 6360000002 HC RX W HCPCS

## 2021-10-27 PROCEDURE — 87186 SC STD MICRODIL/AGAR DIL: CPT

## 2021-10-27 PROCEDURE — 99223 1ST HOSP IP/OBS HIGH 75: CPT | Performed by: INTERNAL MEDICINE

## 2021-10-27 PROCEDURE — 2060000000 HC ICU INTERMEDIATE R&B

## 2021-10-27 PROCEDURE — 6370000000 HC RX 637 (ALT 250 FOR IP): Performed by: STUDENT IN AN ORGANIZED HEALTH CARE EDUCATION/TRAINING PROGRAM

## 2021-10-27 PROCEDURE — 6360000002 HC RX W HCPCS: Performed by: STUDENT IN AN ORGANIZED HEALTH CARE EDUCATION/TRAINING PROGRAM

## 2021-10-27 PROCEDURE — 83735 ASSAY OF MAGNESIUM: CPT

## 2021-10-27 PROCEDURE — 80048 BASIC METABOLIC PNL TOTAL CA: CPT

## 2021-10-27 PROCEDURE — 76080 X-RAY EXAM OF FISTULA: CPT | Performed by: RADIOLOGY

## 2021-10-27 PROCEDURE — 73700 CT LOWER EXTREMITY W/O DYE: CPT

## 2021-10-27 PROCEDURE — 85610 PROTHROMBIN TIME: CPT

## 2021-10-27 PROCEDURE — 0Y993ZZ DRAINAGE OF RIGHT LOWER EXTREMITY, PERCUTANEOUS APPROACH: ICD-10-PCS | Performed by: RADIOLOGY

## 2021-10-27 PROCEDURE — 85025 COMPLETE CBC W/AUTO DIFF WBC: CPT

## 2021-10-27 PROCEDURE — 87077 CULTURE AEROBIC IDENTIFY: CPT

## 2021-10-27 PROCEDURE — 1200000000 HC SEMI PRIVATE

## 2021-10-27 PROCEDURE — 2580000003 HC RX 258: Performed by: STUDENT IN AN ORGANIZED HEALTH CARE EDUCATION/TRAINING PROGRAM

## 2021-10-27 PROCEDURE — 87070 CULTURE OTHR SPECIMN AEROBIC: CPT

## 2021-10-27 PROCEDURE — 36415 COLL VENOUS BLD VENIPUNCTURE: CPT

## 2021-10-27 PROCEDURE — 49424 ASSESS CYST CONTRAST INJECT: CPT | Performed by: RADIOLOGY

## 2021-10-27 PROCEDURE — 87205 SMEAR GRAM STAIN: CPT

## 2021-10-27 RX ORDER — HYDROCHLOROTHIAZIDE 25 MG/1
25 TABLET ORAL DAILY
Status: ON HOLD | COMMUNITY
End: 2021-10-28 | Stop reason: HOSPADM

## 2021-10-27 RX ORDER — HYDROCHLOROTHIAZIDE 25 MG/1
25 TABLET ORAL DAILY
Status: DISCONTINUED | OUTPATIENT
Start: 2021-10-27 | End: 2021-10-28 | Stop reason: HOSPADM

## 2021-10-27 RX ORDER — SODIUM CHLORIDE 9 MG/ML
25 INJECTION, SOLUTION INTRAVENOUS PRN
Status: DISCONTINUED | OUTPATIENT
Start: 2021-10-27 | End: 2021-10-28 | Stop reason: HOSPADM

## 2021-10-27 RX ORDER — FLUTICASONE PROPIONATE 50 MCG
2 SPRAY, SUSPENSION (ML) NASAL DAILY
Status: DISCONTINUED | OUTPATIENT
Start: 2021-10-27 | End: 2021-10-28 | Stop reason: HOSPADM

## 2021-10-27 RX ORDER — POTASSIUM CHLORIDE 20 MEQ/1
20 TABLET, EXTENDED RELEASE ORAL 2 TIMES DAILY WITH MEALS
Status: DISCONTINUED | OUTPATIENT
Start: 2021-10-27 | End: 2021-10-27

## 2021-10-27 RX ORDER — ONDANSETRON 2 MG/ML
4 INJECTION INTRAMUSCULAR; INTRAVENOUS EVERY 6 HOURS PRN
Status: DISCONTINUED | OUTPATIENT
Start: 2021-10-27 | End: 2021-10-28 | Stop reason: HOSPADM

## 2021-10-27 RX ORDER — SIMVASTATIN 20 MG
20 TABLET ORAL NIGHTLY
COMMUNITY

## 2021-10-27 RX ORDER — ACETAMINOPHEN 325 MG/1
650 TABLET ORAL EVERY 6 HOURS PRN
Status: DISCONTINUED | OUTPATIENT
Start: 2021-10-27 | End: 2021-10-27 | Stop reason: SDUPTHER

## 2021-10-27 RX ORDER — ONDANSETRON 4 MG/1
4 TABLET, ORALLY DISINTEGRATING ORAL EVERY 8 HOURS PRN
Status: DISCONTINUED | OUTPATIENT
Start: 2021-10-27 | End: 2021-10-28 | Stop reason: HOSPADM

## 2021-10-27 RX ORDER — ATORVASTATIN CALCIUM 10 MG/1
10 TABLET, FILM COATED ORAL DAILY
Status: DISCONTINUED | OUTPATIENT
Start: 2021-10-27 | End: 2021-10-28 | Stop reason: HOSPADM

## 2021-10-27 RX ORDER — DEXTROSE MONOHYDRATE 25 G/50ML
12.5 INJECTION, SOLUTION INTRAVENOUS PRN
Status: DISCONTINUED | OUTPATIENT
Start: 2021-10-27 | End: 2021-10-28 | Stop reason: HOSPADM

## 2021-10-27 RX ORDER — INSULIN LISPRO 100 [IU]/ML
0-6 INJECTION, SOLUTION INTRAVENOUS; SUBCUTANEOUS EVERY 6 HOURS
Status: DISCONTINUED | OUTPATIENT
Start: 2021-10-27 | End: 2021-10-28 | Stop reason: HOSPADM

## 2021-10-27 RX ORDER — ACETAMINOPHEN 325 MG/1
650 TABLET ORAL EVERY 6 HOURS PRN
Status: DISCONTINUED | OUTPATIENT
Start: 2021-10-27 | End: 2021-10-28 | Stop reason: HOSPADM

## 2021-10-27 RX ORDER — PANTOPRAZOLE SODIUM 20 MG/1
20 TABLET, DELAYED RELEASE ORAL DAILY
COMMUNITY

## 2021-10-27 RX ORDER — SODIUM CHLORIDE 0.9 % (FLUSH) 0.9 %
5-40 SYRINGE (ML) INJECTION PRN
Status: DISCONTINUED | OUTPATIENT
Start: 2021-10-27 | End: 2021-10-28 | Stop reason: HOSPADM

## 2021-10-27 RX ORDER — SODIUM CHLORIDE 0.9 % (FLUSH) 0.9 %
5-40 SYRINGE (ML) INJECTION EVERY 12 HOURS SCHEDULED
Status: DISCONTINUED | OUTPATIENT
Start: 2021-10-27 | End: 2021-10-28 | Stop reason: HOSPADM

## 2021-10-27 RX ORDER — ACETAMINOPHEN 650 MG/1
650 SUPPOSITORY RECTAL EVERY 6 HOURS PRN
Status: DISCONTINUED | OUTPATIENT
Start: 2021-10-27 | End: 2021-10-28 | Stop reason: HOSPADM

## 2021-10-27 RX ORDER — FUROSEMIDE 20 MG/1
20 TABLET ORAL DAILY
Status: DISCONTINUED | OUTPATIENT
Start: 2021-10-27 | End: 2021-10-28 | Stop reason: HOSPADM

## 2021-10-27 RX ORDER — GABAPENTIN 100 MG/1
100 CAPSULE ORAL EVERY EVENING
COMMUNITY

## 2021-10-27 RX ORDER — POTASSIUM CHLORIDE 7.45 MG/ML
10 INJECTION INTRAVENOUS
Status: DISCONTINUED | OUTPATIENT
Start: 2021-10-27 | End: 2021-10-27

## 2021-10-27 RX ORDER — POLYETHYLENE GLYCOL 3350 17 G/17G
17 POWDER, FOR SOLUTION ORAL DAILY PRN
Status: DISCONTINUED | OUTPATIENT
Start: 2021-10-27 | End: 2021-10-28 | Stop reason: HOSPADM

## 2021-10-27 RX ORDER — NICOTINE POLACRILEX 4 MG
15 LOZENGE BUCCAL PRN
Status: DISCONTINUED | OUTPATIENT
Start: 2021-10-27 | End: 2021-10-28 | Stop reason: HOSPADM

## 2021-10-27 RX ORDER — INSULIN LISPRO 100 [IU]/ML
0-3 INJECTION, SOLUTION INTRAVENOUS; SUBCUTANEOUS NIGHTLY
Status: DISCONTINUED | OUTPATIENT
Start: 2021-10-27 | End: 2021-10-27

## 2021-10-27 RX ORDER — SODIUM CHLORIDE 9 MG/ML
INJECTION, SOLUTION INTRAVENOUS CONTINUOUS
Status: DISCONTINUED | OUTPATIENT
Start: 2021-10-27 | End: 2021-10-28 | Stop reason: HOSPADM

## 2021-10-27 RX ORDER — DEXTROSE MONOHYDRATE 50 MG/ML
100 INJECTION, SOLUTION INTRAVENOUS PRN
Status: DISCONTINUED | OUTPATIENT
Start: 2021-10-27 | End: 2021-10-28 | Stop reason: HOSPADM

## 2021-10-27 RX ORDER — MAGNESIUM SULFATE IN WATER 40 MG/ML
2000 INJECTION, SOLUTION INTRAVENOUS ONCE
Status: COMPLETED | OUTPATIENT
Start: 2021-10-27 | End: 2021-10-27

## 2021-10-27 RX ADMIN — SODIUM CHLORIDE: 9 INJECTION, SOLUTION INTRAVENOUS at 01:08

## 2021-10-27 RX ADMIN — MAGNESIUM SULFATE HEPTAHYDRATE 2000 MG: 2 INJECTION, SOLUTION INTRAVENOUS at 16:13

## 2021-10-27 RX ADMIN — Medication 10 ML: at 09:42

## 2021-10-27 RX ADMIN — SODIUM CHLORIDE: 9 INJECTION, SOLUTION INTRAVENOUS at 17:01

## 2021-10-27 RX ADMIN — INSULIN LISPRO 1 UNITS: 100 INJECTION, SOLUTION INTRAVENOUS; SUBCUTANEOUS at 21:35

## 2021-10-27 RX ADMIN — POTASSIUM CHLORIDE 10 MEQ: 7.45 INJECTION INTRAVENOUS at 14:10

## 2021-10-27 RX ADMIN — CEFEPIME HYDROCHLORIDE 1000 MG: 1 INJECTION, POWDER, FOR SOLUTION INTRAMUSCULAR; INTRAVENOUS at 21:18

## 2021-10-27 RX ADMIN — POTASSIUM BICARBONATE 40 MEQ: 782 TABLET, EFFERVESCENT ORAL at 18:36

## 2021-10-27 RX ADMIN — TIOTROPIUM BROMIDE AND OLODATEROL 2 PUFF: 3.124; 2.736 SPRAY, METERED RESPIRATORY (INHALATION) at 17:38

## 2021-10-27 RX ADMIN — POTASSIUM CHLORIDE 10 MEQ: 7.45 INJECTION INTRAVENOUS at 13:18

## 2021-10-27 RX ADMIN — ATORVASTATIN CALCIUM 10 MG: 10 TABLET, FILM COATED ORAL at 09:43

## 2021-10-27 ASSESSMENT — PAIN SCALES - GENERAL
PAINLEVEL_OUTOF10: 0

## 2021-10-27 ASSESSMENT — PAIN DESCRIPTION - PAIN TYPE: TYPE: ACUTE PAIN

## 2021-10-27 NOTE — PROCEDURES
IR Brief Postoperative Note    Keny Connolly  YOB: 1930  [id number]    Pre-operative Diagnosis: hematoma, anticoagulation    Post-operative Diagnosis: Same    Procedure: R lower extremity abscessogram. The drain is patent. The fluid component of the collection was aspirated entirely. After conferring with Dr. Mahsa Garcia, it was determined appropriate to remove the drain    Anesthesia: none    Surgeons/Assistants: karen    Estimated Blood Loss: Minimal    Complications: none    Specimens: were obtained.  Serosanguinous fluid aspirated was sent for culture, gram stain, cell count and differential.     See full procedure dictation to follow      Diego Rodrigez MD MD  10/27/2021

## 2021-10-27 NOTE — CONSULTS
Infectious Diseases Inpatient Consult Note    Reason for Consult:   R knee infected hematoma, bacteremia   Requesting Physician:   Dr Demar Fermin  Primary Care Physician:  Therese Byrne  History Obtained From:   Pt, EPIC    Admit Date: 10/26/2021  Hospital Day: 2    CHIEF COMPLAINT:       Chief Complaint   Patient presents with    Abnormal Lab     WAS SEEN AT [facility] SUNDAY NIGHT FOR FEVERS/CHILLS. HAD BLOOD DRAWN AND WAS TOLD TODAY TO GO TO ER FOR \"BACTERIA IN BLOOD\". HAD KNEE PROCEDURE IN [facility]. HISTORY OF PRESENT ILLNESS:      81 yo woman with hx CAD, HTN, DM, HL, DVT, hearing loss, cataract    She was on xarelto. She fell and bumped her R knee against her walker. She developed R leg hematoma. She had fluid aspirated. Had drain placed 10/20 [facility] - 800 ml dark sanguinous fluid evacuated, 8 Fr drain.     On 10/25, pt had chills, redness of R leg  Seen at [facility] ED, had BC drain, discharged on po cefdinir  BC + PCR - Enterobacteraciae, Streptococcal sp    Presented to [facility] ED on 10/26, afeb, WBC 13.3  Admit, started on cefepime    Today 10/27, seen in IR, drained fluid, removed drain (Dr Jamie Cook had conferred with me)      Past Medical History:    Past Medical History:   Diagnosis Date    Diabetic neuropathy ([address]     Gastritis     Hearing loss     bilateral hearing aids    Hyperlipidemia     Hypertension     MI (myocardial infarction) ([address] April 1986    Angioplasty    OA (osteoarthritis)     S/P colonoscopy 8-    Screening mammogram 12-    Due 1 yr    Type II or unspecified type diabetes mellitus without mention of complication, not stated as uncontrolled        Past Surgical History:    Past Surgical History:   Procedure Laterality Date    ANGIOPLASTY  1986    CATARACT REMOVAL WITH IMPLANT Right [id number]    ELECTROCARDIOGRAM  2/1985    ELECTROCARDIOGRAM  5/1995    EYE SURGERY Left 10/13    phaco with iol       Current Medications:     fluticasone  2 spray Nasal Daily  [Held by provider] furosemide  20 mg Oral Daily    [Held by provider] hydroCHLOROthiazide  25 mg Oral Daily    [Held by provider] metoprolol tartrate  12.5 mg Oral BID    atorvastatin  10 mg Oral Daily    sodium chloride flush  5-40 mL IntraVENous 2 times per day    insulin lispro  0-6 Units SubCUTAneous Q6H    cefepime  1,000 mg IntraVENous Q24H    magnesium sulfate  2,000 mg IntraVENous Once    tiotropium-olodaterol  2 puff Inhalation Daily    potassium bicarb-citric acid  40 mEq Oral Once       Allergies:  Erythromycin and Penicillins    Social History:    TOBACCO:    None   ETOH:    Occasional   DRUGS:   None   MARITAL STATUS:        Patient lives senior / assisted living    Family History:   No immunodeficiency    REVIEW OF SYSTEMS:    No fever / chills / sweats. No weight loss. No visual change, eye pain, eye discharge. No oral lesion, sore throat, dysphagia. Denies cough / sputum. Denies chest pain, palpitations. Denies n / v / abd pain. No diarrhea. Denies dysuria or change in urinary function. Denies joint swelling or pain. No myalgia, arthralgia. Denies skin changes, itching  Denies focal weakness, sensory change or other neurologic symptom    Denies new / worse depression, psychiatric symptoms    PHYSICAL EXAM:      Vitals:    BP (!) 150/58   Pulse 70   Temp 97.9 °F (36.6 °C) (Oral)   Resp 20   Ht 5' 4\" (1.626 m)   Wt 159 lb 2.8 oz (72.2 kg)   SpO2 94%   BMI 27.32 kg/m²     GENERAL: No apparent distress. HEENT: Membranes moist, no oral lesion, PERRL  NECK:  Supple, no lymphadenopathy  LUNGS: Clear b/l, no rales, no dullness  CARDIAC: RRR, no murmur appreciated  ABD:  + BS, soft / NT  EXT:  No rash, no edema, no lesions  R leg with redness, warm, no fluctuance.   Exit site (from wher drain was) - no driainage   NEURO: No focal neurologic findings  PSYCH: Orientation, sensorium, mood normal  LINES:  Peripheral iv    DATA:    Lab Results   Component Value Date    WBC

## 2021-10-27 NOTE — PLAN OF CARE
Problem: Falls - Risk of:  Goal: Will remain free from falls  Description: Will remain free from falls  Outcome: Ongoing  Patient is alert and oriented. She is hard of hearing but able to follow commands and verbalize her needs. Weakness to right leg due to swelling and tenderness. Ambulates with walker, gait belt and one person assistance. Gait steady. In bed with alarm activated and call light in reach. Will continue to monitor for safety. Problem: Skin Integrity:  Goal: Demonstration of wound healing without infection will improve  Description: Demonstration of wound healing without infection will improve  Outcome: Ongoing  Right knee and surrounding area red, warm and swollen. Skin remains intact. Patient is continent of bowel and bladder. Also able to reposition herself in bed. Will continue to keep skin clean and dry and monitor for safety. Problem: Physical Regulation:  Goal: Ability to maintain vital signs within normal range will improve  Description: Ability to maintain vital signs within normal range will improve  Outcome: Ongoing  Afebrile. VSS. Will monitor.

## 2021-10-27 NOTE — PROGRESS NOTES
IR consult received regarding evaluation of drain placed 10/20. No new cross sectional imaging of the knee since placement. Drain CT reviewed with drain placed in the dominant collection. Additional hematomas loculated at the lateral aspect of the right knee noted. Recommend CT or MRI of the knee before possibly removing the drain.      Robby Zelaya MD

## 2021-10-27 NOTE — PROGRESS NOTES
4 Eyes Admission Assessment     I agree as the admission nurse that 2 RN's have performed a thorough Head to Toe Skin Assessment on the patient. ALL assessment sites listed below have been assessed on admission. Areas assessed by both nurses:  [x]   Head, Face, and Ears   [x]   Shoulders, Back, and Chest  [x]   Arms, Elbows, and Hands   [x]   Coccyx, Sacrum, and Ischium  [x]   Legs, Feet, and Heels        Does the Patient have Skin Breakdown? Yes a wound was noted on the Admission Assessment and an LDA was Initiated documentation include the Lisseth-wound, Wound Assessment, Measurements, Dressing Treatment, Drainage, and Color\",  Patient has a drain on her right knee. Patient's knee is swollen and red.         Santos Prevention initiated:  Yes   Wound Care Orders initiated:  NA      [facility] nurse consulted for Pressure Injury (Stage 3,4, Unstageable, DTI, NWPT, and Complex wounds) or Santos score 18 or lower:  NA      Nurse 1 eSignature: Electronically signed by Bettie Alejo RN on 10/27/21 at 7:51 AM EDT    **SHARE this note so that the co-signing nurse is able to place an eSignature**    Nurse 2 eSignature: Electronically signed by Maryellen Leos RN on 10/27/21 at 7:39 AM EDT

## 2021-10-27 NOTE — CARE COORDINATION
Case Management Assessment           Initial Evaluation                Date / Time of Evaluation: 10/27/2021 2:42 PM                 Assessment Completed by: Maura Vasquez RN    Patient Name: Mariana Amador     YOB: 1930  Diagnosis: Bacteremia [R78.81]  Bacteremia due to Enterobacter species [R78.81, B96.89]     Date / Time: 10/26/2021  5:47 PM    Patient Admission Status: Inpatient    If patient is discharged prior to next notation, then this note serves as note for discharge by case management. Current PCP: [facility] Patient: No    Chart Reviewed: Yes  Patient/ Family Interviewed: Yes    Initial assessment completed at bedside with: patient    Hospitalization in the last 30 days: No    Emergency Contacts:  Extended Emergency Contact Information  Primary Emergency Contact: [address] Phone: [phone]  Mobile Phone: [phone]  Relation: Child  Secondary Emergency Contact: [place]  Phone: [phone]  Relation: Brother/Sister    Advance Directives:   Code Status: [address]: No    Financial  Payor: MEDICARE / Plan: MEDICARE PART A AND B / Product Type: *No Product type* /     Pre-cert required for SNF: No    Pharmacy    [address] [place] [address]. [place] [address]  Phone: [phone] Fax: [phone]      Potential assistance Purchasing Medications:    Does Patient want to participate in local refill/ meds to beds program?:      Meds To Beds General Rules:  1. Can ONLY be done Monday- Friday between 8:30am-5pm  2. Prescription(s) must be in pharmacy by 3pm to be filled same day  3. Copy of patient's insurance/ prescription drug card and patient face sheet must be sent along with the prescription(s)  4. Cost of Rx cannot be added to hospital bill.  If financial assistance is needed, please contact unit  or ;  or  CANNOT provide pharmacy voucher for patients co-pays  5. Patients can then  the prescription on their way out of the hospital at discharge, or pharmacy can deliver to the bedside if staff is available. (payment due at time of pick-up or delivery - cash, check, or card accepted)     Able to afford home medications/ co-pay costs: Yes    ADLS  Support Systems:      PT AM-PAC:   /24  OT AM-PAC:   /24    [place]: From [facility]  Steps: none    Plans to RETURN to current housing: Yes  Barriers to RETURNING to current housing: medical complications     Home Care Information  Currently ACTIVE with [address]: No  Home Care Agency: Not Applicable    Currently ACTIVE with [place] [facility]: No  Passport/ Waiver: No  Passport/ Waiver Services: Not Applicable        Durable Medical Equipment  DME Provider: n/a  Equipment: walker and bath bench    Home Oxygen and Respiratory Equipment  Has HOME OXYGEN prior to admission: No  [address]: Not Applicable        DISCHARGE PLAN:  Disposition: Home with [address]: [place] (or whomever [employer] with )     Transportation PLAN for discharge: family     Factors facilitating achievement of predicted outcomes: Family support, Caregiver support, Motivated, Cooperative, Pleasant and Has needed Durable Medical Equipment at home    Barriers to discharge: Medical complications and Medication managment    Additional Case Management Notes: CM met with pt at bedside, pt is from [place] and has a house keeper once a week. She [place] some meals [place] Pt ambulates with a walker. Pt recently finished [employer] and is open to it again. One of her grandchildren can transport her at discharge.      The Plan for Transition of Care is related to the following treatment goals of Bacteremia [R78.81]  Bacteremia due to Enterobacter species [R78.81, B96.89]    The Patient and/or patient representative Deysi and her family were provided with a choice of provider and agrees with the discharge plan Yes    Freedom of choice list was provided with basic dialogue that supports the patient's individualized plan of care/goals and shares the quality data associated with the providers.  Yes    Care Transition patient: No    Chastity Avalos RN  [facility] [employer]  Case Management Department  Ph: [phone]   Fax: [phone]

## 2021-10-27 NOTE — PROGRESS NOTES
Progress Note    Admit Date: 10/26/2021  Day: 2  Diet: Diet NPO    CC: Knee swelling, erythema. Interval history:   NAEON. Patient seen at bedside. Sitting up with R knee elevated. No acute complaints. Patient denies fever, chills, nausea, vomiting, diarrhea/constipation, acute urinary abnormalities. HPI:   81 y/o F w/ PMHx of CAD, DVT, COPD, hx of lung adenocarcinoma R lung, CKD3, T2DM, R knee hematoma who presents to ED after having a positive blood culture. The patient has a right knee hematoma w/ complicated history. Patient reports that sometime around July patient bumped her knee. She started having pain and swelling in the knee at this time. Subsequently the patient followed up w/ her PCP, orthopaedic surgery, Dr. Jas Fernando, who drained the hematoma X 2, and wound clinic at [facility]. The patient was ultimately referred to Plastic Surgery (10/18), Dr. Ede Gómez, who recommended US w/ aspiration and drain placement w/ IR. The patient had pigtail drain placed on 10/20 by Dr. Tosha Patterson at [facility] and cytology showed fibrin and blood products. At first the drain had dark blood, but later drained clear fluid. The patient reported that starting Sunday she started having subjective chills. Additionally patient noticed new redness and some moderate, dull pain that sometime radiated down her leg. She reports that her drain started to have less output a couple days ago, and has not had output for a couple days. The patient ambulates using a walker and states that her knee pain and swelling has not kept her from walking or putting weight on her knee but has made ambulation more difficult. The patient takes Xarelto for hx of DVT, she reported that she was instructed to not take her Xarelto over the weekend, but started it back this morning taking a dose. Yesterday, the patient presented to [facility] for evaluation of redness in her knee. Blood cultures were taken and patient was discharged on Cefdinir.  Today, the patient's first blood culture came back positive for PCR detected Streptococcus species and Enterobacteriaceae. The second set had no growth to date. The patient was recommended to come to the ED, she had a planned clinic appointment w/ Dr. Chelo Castañeda on 10/27. On arrival to the ED the patient's vital signs were stable. Labs were remarkable for Cr of 1.8 (baseline 1.3-1.5) and WBC of 13. 3. XR of right knee showed no acute osseus abnormality. Patient was admitted to medical floor for further workup and care. Medications:     Scheduled Meds:   fluticasone  2 spray Nasal Daily    [Held by provider] furosemide  20 mg Oral Daily    [Held by provider] hydroCHLOROthiazide  25 mg Oral Daily    [Held by provider] metoprolol tartrate  12.5 mg Oral BID    atorvastatin  10 mg Oral Daily    umeclidinium-vilanterol  1 puff Inhalation Daily    sodium chloride flush  5-40 mL IntraVENous 2 times per day    insulin lispro  0-6 Units SubCUTAneous Q6H    cefepime  1,000 mg IntraVENous Q24H     Continuous Infusions:   sodium chloride      dextrose      sodium chloride 100 mL/hr at 10/27/21 0554     PRN Meds:sodium chloride flush, sodium chloride, ondansetron **OR** ondansetron, polyethylene glycol, acetaminophen **OR** acetaminophen, glucose, dextrose, glucagon (rDNA), dextrose    Objective:   Vitals:   T-max:  Patient Vitals for the past 8 hrs:   BP Temp Temp src Pulse Resp SpO2   10/27/21 0936 (!) 150/58 97.9 °F (36.6 °C) Oral 70 20 94 %   10/27/21 0845 -- -- -- 82 -- --   10/27/21 0428 92/72 98 °F (36.7 °C) Oral 76 18 96 %     No intake or output data in the 24 hours ending 10/27/21 1021    Review of Systems   Per Interval hx. Physical Exam  Constitutional:       Appearance: Normal appearance. HENT:      Head: Normocephalic and atraumatic. Nose: Nose normal.      Mouth/Throat:      Mouth: Mucous membranes are moist.      Pharynx: Oropharynx is clear.    Eyes:      Extraocular Movements: Extraocular movements intact. Conjunctiva/sclera: Conjunctivae normal.   Cardiovascular:      Rate and Rhythm: Normal rate and regular rhythm. Pulses: Normal pulses. Heart sounds: Normal heart sounds. Pulmonary:      Effort: Pulmonary effort is normal. No respiratory distress. Breath sounds: Normal breath sounds. Abdominal:      General: Abdomen is flat. Bowel sounds are normal.      Palpations: Abdomen is soft. Genitourinary:     Comments: Deferred. Musculoskeletal:         General: Normal range of motion. Cervical back: Normal range of motion and neck supple. Skin:     General: Skin is warm and dry. Capillary Refill: Capillary refill takes less than 2 seconds. Neurological:      General: No focal deficit present. Mental Status: She is alert and oriented to person, place, and time. Psychiatric:         Mood and Affect: Mood normal.         Behavior: Behavior normal.         Thought Content: Thought content normal.         Judgment: Judgment normal.             LABS:    CBC:   Recent Labs     10/26/21  1909 10/27/21  0433   WBC 13.3* 10.6   HGB 10.6* 10.2*   HCT 32.5* 30.1*    219   MCV 88.4 87.7     Renal:    Recent Labs     10/26/21  1909 10/27/21  0433    143   K 3.5 3.5    106   CO2 22 22   BUN 40* 35*   CREATININE 1.8* 1.6*   GLUCOSE 136* 126*   CALCIUM 9.0 8.6   MG 2.00 1.90   ANIONGAP 16 15     Hepatic: No results for input(s): AST, ALT, BILITOT, BILIDIR, PROT, LABALBU, ALKPHOS in the last 72 hours. Troponin: No results for input(s): TROPONINI in the last 72 hours. BNP: No results for input(s): BNP in the last 72 hours. Lipids: No results for input(s): CHOL, HDL in the last 72 hours. Invalid input(s): LDLCALCU, TRIGLYCERIDE  ABGs:  No results for input(s): PHART, IIW2SDV, PO2ART, OJK3ZTP, BEART, THGBART, L7VGPPIT, GWK0GCM in the last 72 hours.     INR:   Recent Labs     10/27/21  0433   INR 1.87*     Lactate: No results for input(s): LACTATE in the Tasha Avilez MD, PGY-1  10/27/21  10:21 AM    This patient has been staffed and discussed with Ashley Crystal MD.       Patient seen and examined, labs and imaging reviewed, agree with assessment and plan as outlined above. patients condition continues to improve doing well, asked patient to monitor side effects of medications which i've discussed at length, recurrence of symptoms or new symptoms including but not limited to chest pain, shortness of breath, nausea, vomiting, fevers or chills and seek immediate medical attention or call 911. Greater than 30 minutes spent on case on day of discharge.      Mario Choe MD FACP

## 2021-10-27 NOTE — PROGRESS NOTES
Plastic Surgery   Consult Note    Reason for Consult: knee swelling    History of Present Illness:   Mallory Cheema is a 80 y.o. female with Hx of CAD, CKD, DM2, COPD, DVT, R knee hematoma requiring drain placement on 10/20/21, presented to the ED after being told blood cultures drawn while being evaluated at [facility] on 10/25/21 were positive for Enterobacter and Streptococcus. The pt recently saw [facility] plastic surgery for evaluation of R knee swelling and had the drain placed by IR with the intention of drainage of fluid and possible sclerosis. The drain did result in 800 cc immediatly out. The pt states it has not had much out since the intial drainage. The pt states since the drain was placed in her right knee she has experienced pain to that location, as well as gradually worsening swelling after the immediate reduction following drainage. The erythema to her R knee and down to her foot started after drain placement and she states has only gotten worse. She denies paraesthesias to the R foot. She is able to bend her knee and  bear weight with minimal pain. She states she wants the drain removed. She denies fevers, chills, SOB, CP, sick contacts, or bladder or bowel issues.        Past Medical History:        Diagnosis Date    Diabetic neuropathy ([facility] [address]     Gastritis     Hearing loss     bilateral hearing aids    Hyperlipidemia     Hypertension     MI (myocardial infarction) [facility]) April 1986    Angioplasty    OA (osteoarthritis)     S/P colonoscopy 8-    Screening mammogram 12-    Due 1 yr    Type II or unspecified type diabetes mellitus without mention of complication, not stated as uncontrolled        Past Surgical History:        Procedure Laterality Date    ANGIOPLASTY  1986    CATARACT REMOVAL WITH IMPLANT Right [id number]    ELECTROCARDIOGRAM  2/1985    ELECTROCARDIOGRAM  5/1995    EYE SURGERY Left 10/13    phaco with iol       Allergies:  Erythromycin and Penicillins    Medications:   Home Meds  No current facility-administered medications on file prior to encounter. Current Outpatient Medications on File Prior to Encounter   Medication Sig Dispense Refill    vitamin B-12 (CYANOCOBALAMIN) 500 MCG tablet Take 500 mcg by mouth daily      Cholecalciferol (VITAMIN D3) 125 MCG (5000 UT) TABS Take 10,000 Units by mouth      metFORMIN (GLUCOPHAGE) 500 MG tablet TAKE HALF OF A TABLET ( 250 MG) BY MOUTH IN THE AM WITH BREAKFAST. 90 tablet 0    furosemide (LASIX) 20 MG tablet Take 20 mg by mouth      acetaminophen (TYLENOL) 325 MG tablet Take 650 mg by mouth every 6 hours as needed for Pain      calcium carbonate 600 MG TABS tablet Take 1 tablet by mouth daily Indications: Patient takes 200mg as needed      nitroGLYCERIN (NITROSTAT) 0.4 MG SL tablet Place 0.4 mg under the tongue every 5 minutes as needed for Chest pain up to max of 3 total doses. If no relief after 1 dose, call 911. ondansetron (ZOFRAN) 4 MG tablet Take 4 mg by mouth every 8 hours as needed for Nausea or Vomiting      simvastatin (ZOCOR) 40 MG tablet TAKE 1 TABLET BY MOUTH EVERY DAY IN THE EVENING 90 tablet 1    umeclidinium-vilanterol (ANORO ELLIPTA) 62.5-25 MCG/INH AEPB inhaler Inhale 1 puff into the lungs daily 1 each 0    blood glucose test strips (FREESTYLE LITE) strip Pt test once daily E11.9 100 strip 1    aspirin 81 MG tablet Take 81 mg by mouth daily      Compression Stockings MISC by Does not apply route 1 each 0    FREESTYLE LANCETS MISC TEST TWICE A DAY AS DIRECTED dx 250.00 100 each 3    Multiple Vitamin (MULTIVITAMIN PO) Take  by mouth daily. metoprolol (LOPRESSOR) 25 MG tablet 12.5 mg 2 times daily Patient instructed to take beta blockers with a sip of water the morning of surgery per Dr. Sophie Mccallum.          Current Meds  fluticasone (FLONASE) 50 MCG/ACT nasal spray 2 spray, Daily  [Held by provider] furosemide (LASIX) tablet 20 mg, Daily  [Held by provider] hydroCHLOROthiazide (HYDRODIURIL) tablet 25 mg, Daily  [Held by provider] metoprolol tartrate (LOPRESSOR) tablet 12.5 mg, BID  atorvastatin (LIPITOR) tablet 10 mg, Daily  umeclidinium-vilanterol (ANORO ELLIPTA) 62.5-25 MCG/INH inhaler 1 puff, Daily  sodium chloride flush 0.9 % injection 5-40 mL, 2 times per day  sodium chloride flush 0.9 % injection 5-40 mL, PRN  0.9 % sodium chloride infusion, PRN  ondansetron (ZOFRAN-ODT) disintegrating tablet 4 mg, Q8H PRN   Or  ondansetron (ZOFRAN) injection 4 mg, Q6H PRN  polyethylene glycol (GLYCOLAX) packet 17 g, Daily PRN  acetaminophen (TYLENOL) tablet 650 mg, Q6H PRN   Or  acetaminophen (TYLENOL) suppository 650 mg, Q6H PRN  glucose (GLUTOSE) 40 % oral gel 15 g, PRN  dextrose 50 % IV solution, PRN  glucagon (rDNA) injection 1 mg, PRN  dextrose 5 % solution, PRN  insulin lispro (1 Unit Dial) 0-6 Units, Q6H  cefepime (MAXIPIME) 1000 mg IVPB minibag, Q24H  0.9 % sodium chloride infusion, Continuous        Family History:   Family History   Problem Relation Age of Onset    Cancer Mother     Heart Disease Father     Heart Disease Sister     Cancer Brother        Social History:   TOBACCO:   reports that she quit smoking about 38 years ago. She has never used smokeless tobacco.  ETOH:   reports current alcohol use of about 1.0 standard drinks of alcohol per week. DRUGS:   reports no history of drug use. Review of Systems:     Constitutional: Negative. HENT: Negative. Eyes: Negative. Respiratory: Negative. Cardiovascular: Negative. Gastrointestinal: Negative  Genitourinary: Negative. Musculoskeletal: see HPI  Skin: See HPI  Endocrine: Negative. Allergic/Immunologic: Negative. Neurological: Negative. Hematological: Negative. Psychiatric/Behavioral: Negative.     Physical exam:    Vitals:    10/26/21 1930 10/26/21 2032 10/27/21 0005 10/27/21 0428   BP: 125/82 122/62 (!) 141/54 92/72   Pulse: 82 78 75 76   Resp: 17 16 18 18   Temp:   99.4 °F (37.4 °C) 98 °F (36.7 °C)   TempSrc:   Oral Oral   SpO2: 93% 95% 92% 96%   Weight:   159 lb 2.8 oz (72.2 kg)    Height:   5' 4\" (1.626 m)        General appearance: alert, no acute distress, grooming appropriate  Eyes: PERRLA, no scleral icterus  Neck: trachea midline, no JVD, no lymphadenopathy  Chest/Lungs: CTAB, no crackles/rales, wheezes/rhonchi, normal effort  Cardiovascular: RRR, no murmurs/gallops/rubs  Abdomen: soft, non-tender, non-distended, +BS, no guarding/rigidity  Skin: warm and dry, no rashes  Extremities: R LE edema from knee to toes, Erythema, circumferentially from knee to toes, Drain to R lateral knee draining serous fluid, no cyanosis  Neuro: A&Ox3, no focal deficits, sensation intact              Labs:    CBC:   Recent Labs     10/26/21  1909 10/27/21  0433   WBC 13.3* 10.6   HGB 10.6* 10.2*   HCT 32.5* 30.1*   MCV 88.4 87.7    219     BMP:   Recent Labs     10/26/21  1909 10/27/21  0433    143   K 3.5 3.5    106   CO2 22 22   BUN 40* 35*   CREATININE 1.8* 1.6*     PT/INR:   Recent Labs     10/27/21  0433   PROTIME 21.7*   INR 1.87*       UA:   Lab Results   Component Value Date    NITRITE negative 06/26/2019    COLORU Yellow 10/26/2021    PHUR 6.0 10/26/2021    WBCUA None seen 10/26/2021    RBCUA None seen 10/26/2021    BACTERIA Rare 02/03/2011    CLARITYU Clear 10/26/2021    SPECGRAV 1.025 10/26/2021    LEUKOCYTESUR TRACE 10/26/2021    UROBILINOGEN 0.2 10/26/2021    BILIRUBINUR Negative 10/26/2021    BILIRUBINUR negative 06/26/2019    BILIRUBINUR NEGATIVE 02/03/2011    BLOODU TRACE-INTACT 10/26/2021    GLUCOSEU Negative 10/26/2021    GLUCOSEU NEGATIVE 02/03/2011       Imaging:   CT KNEE RIGHT WO CONTRAST   Final Result   Impression: Interval decrease in size of the right knee hematoma. A percutaneous drainage catheter is in appropriate position. Mild skin thickening with subcutaneous infiltration, similar to prior. Correlate clinically for cellulitis.       XR KNEE RIGHT (1-2 VIEWS)   Final Result   Impression:   Pigtail drain and soft tissue swelling as above.   No acute osseous abnormality. IR FISTULAGRAM    (Results Pending)         Assessment/Plan:  Mat Zamora is a 80 y.o. female with Hx of CAD, CKD, DM2, COPD, DVT, and R knee hematoma requiring drain placement on 10/20/21, who presented to the ED after being evaluated at [facility] on 10/25/21 where blood cultures were drawn and found to be positive with Streptococcus and Enterobacteriaceae. The pt recently saw [facility] plastic surgery for evaluation of R knee swelling and had the drain placed via IR with the intention of drainage of fluid and possible sclerosis. Despite her positive blood cultures and swelling/erythema to the right knee, patient remains afebrile, hemodynamically stable without leukocytosis. Following our initial evaluation this morning, patient was taken to IR for drain study; following confirmation of accurate placement within the medial fluid collection, the remaining drain fluid was sent for culture/cell count and the drain ultimately removed. - Continue Cefepime, adjustments per ID recommendations  - Follow up drain culture & cell count  - Follow up repeat blood cultures (10/26)  - Further management per primary team  - Patient, plan discussed with surgical team, surgical staff Dr Ashu Zhang. Ming Claudio MD  PGY-2, General Surgery  10/27/21  2:37 PM  [phone]    I saw and independently examined the patient today. I agree with the history of present illness, past medical/surgical histories, family history, social history, medication list and allergies as listed. The review of systems is as noted above. My physical exam confirms the findings listed above. Review of labs, pathology reports, radiology reports and medical records confirm the findings noted above. I edited the note where appropriate in italics, strikethrough font, or underline. (LATE ENTRY)    Patient referred by orthopedics.   Recommended IR drainage of large collection of the knee, which improved her collection. Admitted now with sepsis - plastic surgery consulted as patient known to service. Continue with management per primary with antibiotics and resuscitation. Will continue to follow labs.     Qian Brown MD  [address] [place] [address] [address] Reconstructive Surgery  [phone]  10/28/21

## 2021-10-27 NOTE — PROGRESS NOTES
Cefepime  2g q12h ordered for patient. This medication is renally eliminated. Will change to cefepine 1g q24h  per renal dose adjustment policy. Estimated Creatinine Clearance: 20 mL/min (A) (based on SCr of 1.8 mg/dL (H)). Pharmacy will continue to monitor renal function and adjust dose as necessary. Please call with any questions. Thanks!

## 2021-10-27 NOTE — CONSULTS
Clinical Pharmacy Progress Note  Medication History     Admit Date: 10/26/2021    Pharmacy asked to verify home medications per Dr. Marilia Dunaway. List of of current medications patient is taking is complete. Home Medication list in EPIC updated to reflect changes noted below. Source of information: Rx fill history (verified with [facility])    Patient's home pharmacy: [address]  ([phone])    Changes made to medication list:   Medications removed: (include reason, ex: therapy completed, inactive medication)   Metoprolol - no recent Rx fills per [address]   Nitroglycerin SL - no recent Rx fills per [address]  Medications added:    Gabapentin    Hydrochlorothiazide   Rivaroxaban   Pantoprazole  Medication doses adjusted:    Furosemide 40 mg daily   Metformin 500 mg daily   Simvastatin 20 mg daily   Other notes:    Unsure about the following OTC medications: Acetaminophen, Aspirin, calcium, vitamin D, MVI, B12   Unsure if patient is taking Anoro - last filled 4/2021   Pt was previously on amiodarone 200 mg daily - last filled 8/26/21 per [address]      Please call with any questions.   Florentin Anne PharmD, BCPS  Wireless: [id number]   10/27/2021 10:44 AM

## 2021-10-27 NOTE — H&P
Internal Medicine  PGY 1  History & Physical      CC knee swelling, erythema    History Obtained From:  Patient and medical record    HISTORY OF PRESENT ILLNESS:  Grupo Perkins is a 79 y/o F w/ PMHx of CAD, CKD3, DM2, COPD, DVT, R knee hematoma who presents to ED after having a positive blood culture. The patient has a right knee hematoma w/ complicated history. Patient reports that sometime around July patient bumped her knee. She started having pain and swelling in the knee at this time. Subsequently the patient followed up w/ her PCP, orthopaedic surgery, Dr. Sheela Anna, who drained the hematoma X 2, and wound clinic at [facility]. The patient was ultimately referred to Plastic Surgery (10/18), Dr. Pretty Singleton, who recommended US w/ aspiration and drain placement w/ IR. The patient had pigtail drain placed on 10/20 by Dr. Crystal Bone at [facility] and cytology showed fibrin and blood products. At first the drain had dark blood, but later drained clear fluid. The patient reported that starting Sunday she started having subjective chills. Additionally patient noticed new redness and some moderate, dull pain that sometime radiated down her leg. She reports that her drain started to have less output a couple days ago, and has not had output for a couple days. The patient ambulates using a walker and states that her knee pain and swelling has not kept her from walking or putting weight on her knee but has made ambulation more difficult. The patient takes xarelto for hx of DVT, she reported that she was instructed to not take her xarelto over the weekend, but started it back this morning taking a dose. Yesterday, the patient presented to [facility] for evaluation of redness in her knee. Blood cultures were taken and patient was discharged on cefdinir. Today, the patient's first blood culture came back positive for PCR detected streptococcus species and enterobacteriaceae. The second set had no growth to date.  The patient was rhythm. No murmurs heard on auscultation  Pulmonary: clear to auscultation bilaterally, no wheezes, rales, or stridor  GI: abdomen soft, flat, and non-distended. No tenderness or guarding. Musculoskeletal: Extensive swelling in R knee, w/ erythema. R knee warm to palpation. Drain protruding from knee, no fluid in drain. Patient's R knee has full good range of motion. Skin: no lesion, rash, or erythema  Neuro: no focal neurological deficits, alert and oriented X3  Psych: mood normal, behavior normal.     Vitals:    10/26/21 2032   BP: 122/62   Pulse: 78   Resp: 16   Temp:    SpO2: 95%       DATA:    Labs:  CBC:   Recent Labs     10/26/21  1909   WBC 13.3*   HGB 10.6*   HCT 32.5*          BMP:   Recent Labs     10/26/21  1909      K 3.5      CO2 22   BUN 40*   CREATININE 1.8*   GLUCOSE 136*     LFT's: No results for input(s): AST, ALT, ALB, BILITOT, ALKPHOS in the last 72 hours. Troponin: No results for input(s): TROPONINI in the last 72 hours. BNP:No results for input(s): BNP in the last 72 hours. ABGs: No results for input(s): PHART, BQF5WLP, PO2ART in the last 72 hours. INR: No results for input(s): INR in the last 72 hours. U/A:  Recent Labs     10/26/21  2032   COLORU Yellow   PHUR 6.0   WBCUA None seen   RBCUA None seen   CLARITYU Clear   SPECGRAV 1.025   LEUKOCYTESUR TRACE*   UROBILINOGEN 0.2   BILIRUBINUR Negative   BLOODU TRACE-INTACT*   GLUCOSEU Negative       XR KNEE RIGHT (1-2 VIEWS)   Final Result   Impression:   Pigtail drain and soft tissue swelling as above. No acute osseous abnormality. ASSESSMENT AND PLAN:  Xiomara Manzo is a 79 y/o F w/ PMHx of CAD, CKD3, DM2, COPD, DVT, R knee hematoma who presents to ED after having 1 blood culture positive for strep and enterobafteriacea. Patient recently had IR at [facility] place drain on 10/20 and follows w/ Dr. Jasvir Wood for R knee hematoma.         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 A couple days after drain placement patient had increasing erythema and warmth. Patient went to [facility] yesterday and after evaluation was discharged on cefdinir. 1X bcx came back today positive for strep and enterobacter. Source suspected to be drain or overlying skin infection and less likely septic arthritis given patient's range of motion on exam and weight bearing ambulation.  -Consult: Plastic Surgery, IR: Appreciate recommendations  -Patient received dose of Cefepime at [facility] ED 10/25 was discharged on Cefdinir  -(10/26) con Cefepime 2g q12 hrs    R Knee Hematoma  Occurred after trauma in 7/21. Have had multiple drainages, followed w/ wound care, and now plastic surgery consultation. Pigtail drain placed 10/20 w/ cytology coming back for fibrin and RBCs. Drain is no longer draining blood.   -IR Consultation for review of drain placement  -PT/INR pending  -Xarelto and ASA held, patient reports taking last dose of xarelto this AM    CAD  -cont metoprolol 12.5mg BID, atorvastatin 10mg, hydrochlorothiazide 25 mg    DVT hx  -held Xarelto, patient reports last dose w/ this AM    COPD not in exacerbation  -Cont home inhalers    Diabetes Mellitus type 2  -Last Hgb A1c: 7.2 on 8/31/2021  -Hypoglycemia protocol, POCT glucose, LDSSI    Will discuss with attending physician Dr. Perfecto Wilkerson Status:Full code  FEN: No diet orders on file, NPO at midnight  PPX: Held Xarelto  DISPO: JUAN DANIEL Zazueta MD  10/26/2021,  9:55 PM

## 2021-10-27 NOTE — H&P
Patient:  Pura Tapia   :   1930      Relevant patient history reviewed and discussed. The procedure including risks and benefits was discussed at length with the patient (or designated family member) and all questions were answered. Informed consent to proceed with the procedure was given. Condition : stable    [facility] nurses notes reviewed and agreed. Medications reviewed. Allergies:    Allergies   Allergen Reactions    Erythromycin      Upset stomach      Penicillins Swelling

## 2021-10-28 VITALS
WEIGHT: 157.85 LBS | BODY MASS INDEX: 26.95 KG/M2 | RESPIRATION RATE: 20 BRPM | HEIGHT: 64 IN | HEART RATE: 71 BPM | SYSTOLIC BLOOD PRESSURE: 160 MMHG | TEMPERATURE: 97 F | DIASTOLIC BLOOD PRESSURE: 86 MMHG | OXYGEN SATURATION: 96 %

## 2021-10-28 PROBLEM — B96.89 BACTEREMIA DUE TO ENTEROBACTER SPECIES: Status: ACTIVE | Noted: 2021-10-26

## 2021-10-28 LAB
ANION GAP SERPL CALCULATED.3IONS-SCNC: 12 MMOL/L (ref 3–16)
BASOPHILS ABSOLUTE: 0 K/UL (ref 0–0.2)
BASOPHILS RELATIVE PERCENT: 0.5 %
BUN BLDV-MCNC: 32 MG/DL (ref 7–20)
CALCIUM SERPL-MCNC: 8.3 MG/DL (ref 8.3–10.6)
CHLORIDE BLD-SCNC: 111 MMOL/L (ref 99–110)
CO2: 20 MMOL/L (ref 21–32)
CREAT SERPL-MCNC: 1.4 MG/DL (ref 0.6–1.2)
EOSINOPHILS ABSOLUTE: 0.1 K/UL (ref 0–0.6)
EOSINOPHILS RELATIVE PERCENT: 1.8 %
GFR AFRICAN AMERICAN: 43
GFR NON-AFRICAN AMERICAN: 35
GLUCOSE BLD-MCNC: 101 MG/DL (ref 70–99)
GLUCOSE BLD-MCNC: 103 MG/DL (ref 70–99)
GLUCOSE BLD-MCNC: 104 MG/DL (ref 70–99)
GLUCOSE BLD-MCNC: 193 MG/DL (ref 70–99)
HCT VFR BLD CALC: 28.5 % (ref 36–48)
HEMOGLOBIN: 9.5 G/DL (ref 12–16)
LYMPHOCYTES ABSOLUTE: 0.6 K/UL (ref 1–5.1)
LYMPHOCYTES RELATIVE PERCENT: 9 %
MAGNESIUM: 2.3 MG/DL (ref 1.8–2.4)
MCH RBC QN AUTO: 29.7 PG (ref 26–34)
MCHC RBC AUTO-ENTMCNC: 33.4 G/DL (ref 31–36)
MCV RBC AUTO: 88.9 FL (ref 80–100)
MONOCYTES ABSOLUTE: 0.7 K/UL (ref 0–1.3)
MONOCYTES RELATIVE PERCENT: 11.6 %
NEUTROPHILS ABSOLUTE: 4.8 K/UL (ref 1.7–7.7)
NEUTROPHILS RELATIVE PERCENT: 77.1 %
PDW BLD-RTO: 15.5 % (ref 12.4–15.4)
PERFORMED ON: ABNORMAL
PHOSPHORUS: 2 MG/DL (ref 2.5–4.9)
PLATELET # BLD: 215 K/UL (ref 135–450)
PMV BLD AUTO: 7.6 FL (ref 5–10.5)
POTASSIUM REFLEX MAGNESIUM: 3.9 MMOL/L (ref 3.5–5.1)
RBC # BLD: 3.2 M/UL (ref 4–5.2)
SODIUM BLD-SCNC: 143 MMOL/L (ref 136–145)
WBC # BLD: 6.3 K/UL (ref 4–11)

## 2021-10-28 PROCEDURE — 97116 GAIT TRAINING THERAPY: CPT

## 2021-10-28 PROCEDURE — 2580000003 HC RX 258: Performed by: STUDENT IN AN ORGANIZED HEALTH CARE EDUCATION/TRAINING PROGRAM

## 2021-10-28 PROCEDURE — 97165 OT EVAL LOW COMPLEX 30 MIN: CPT

## 2021-10-28 PROCEDURE — 84100 ASSAY OF PHOSPHORUS: CPT

## 2021-10-28 PROCEDURE — 97162 PT EVAL MOD COMPLEX 30 MIN: CPT

## 2021-10-28 PROCEDURE — 99232 SBSQ HOSP IP/OBS MODERATE 35: CPT | Performed by: SURGERY

## 2021-10-28 PROCEDURE — 97530 THERAPEUTIC ACTIVITIES: CPT

## 2021-10-28 PROCEDURE — 94640 AIRWAY INHALATION TREATMENT: CPT

## 2021-10-28 PROCEDURE — 85025 COMPLETE CBC W/AUTO DIFF WBC: CPT

## 2021-10-28 PROCEDURE — 36415 COLL VENOUS BLD VENIPUNCTURE: CPT

## 2021-10-28 PROCEDURE — 97535 SELF CARE MNGMENT TRAINING: CPT

## 2021-10-28 PROCEDURE — 83735 ASSAY OF MAGNESIUM: CPT

## 2021-10-28 PROCEDURE — 6370000000 HC RX 637 (ALT 250 FOR IP): Performed by: STUDENT IN AN ORGANIZED HEALTH CARE EDUCATION/TRAINING PROGRAM

## 2021-10-28 PROCEDURE — 6360000002 HC RX W HCPCS

## 2021-10-28 PROCEDURE — 80048 BASIC METABOLIC PNL TOTAL CA: CPT

## 2021-10-28 PROCEDURE — 99232 SBSQ HOSP IP/OBS MODERATE 35: CPT | Performed by: INTERNAL MEDICINE

## 2021-10-28 PROCEDURE — 94664 DEMO&/EVAL PT USE INHALER: CPT

## 2021-10-28 PROCEDURE — 6370000000 HC RX 637 (ALT 250 FOR IP)

## 2021-10-28 RX ORDER — POTASSIUM CHLORIDE 750 MG/1
10 TABLET, EXTENDED RELEASE ORAL ONCE
Status: COMPLETED | OUTPATIENT
Start: 2021-10-28 | End: 2021-10-28

## 2021-10-28 RX ORDER — CEFDINIR 300 MG/1
300 CAPSULE ORAL 2 TIMES DAILY
Qty: 20 CAPSULE | Refills: 0 | Status: SHIPPED | OUTPATIENT
Start: 2021-10-28 | End: 2021-11-07

## 2021-10-28 RX ORDER — CEFDINIR 300 MG/1
300 CAPSULE ORAL 2 TIMES DAILY
Qty: 20 CAPSULE | Refills: 0 | Status: SHIPPED | OUTPATIENT
Start: 2021-10-28 | End: 2021-10-28 | Stop reason: SDUPTHER

## 2021-10-28 RX ORDER — HYDRALAZINE HYDROCHLORIDE 20 MG/ML
10 INJECTION INTRAMUSCULAR; INTRAVENOUS ONCE
Status: COMPLETED | OUTPATIENT
Start: 2021-10-28 | End: 2021-10-28

## 2021-10-28 RX ORDER — GREEN TEA/HOODIA GORDONII 315-12.5MG
1 CAPSULE ORAL 2 TIMES DAILY
Qty: 60 TABLET | Refills: 0 | Status: SHIPPED | OUTPATIENT
Start: 2021-10-28 | End: 2021-11-27

## 2021-10-28 RX ORDER — CEFDINIR 300 MG/1
300 CAPSULE ORAL 2 TIMES DAILY
Qty: 10 CAPSULE | Refills: 0 | Status: CANCELLED | OUTPATIENT
Start: 2021-10-28 | End: 2021-11-07

## 2021-10-28 RX ADMIN — Medication 10 ML: at 09:25

## 2021-10-28 RX ADMIN — INSULIN LISPRO 1 UNITS: 100 INJECTION, SOLUTION INTRAVENOUS; SUBCUTANEOUS at 13:36

## 2021-10-28 RX ADMIN — ACETAMINOPHEN 650 MG: 325 TABLET ORAL at 00:22

## 2021-10-28 RX ADMIN — POTASSIUM CHLORIDE 10 MEQ: 10 TABLET, EXTENDED RELEASE ORAL at 09:25

## 2021-10-28 RX ADMIN — HYDRALAZINE HYDROCHLORIDE 10 MG: 20 INJECTION INTRAMUSCULAR; INTRAVENOUS at 12:36

## 2021-10-28 RX ADMIN — TIOTROPIUM BROMIDE AND OLODATEROL 2 PUFF: 3.124; 2.736 SPRAY, METERED RESPIRATORY (INHALATION) at 10:26

## 2021-10-28 RX ADMIN — ATORVASTATIN CALCIUM 10 MG: 10 TABLET, FILM COATED ORAL at 09:25

## 2021-10-28 RX ADMIN — SODIUM CHLORIDE: 9 INJECTION, SOLUTION INTRAVENOUS at 10:19

## 2021-10-28 ASSESSMENT — PAIN SCALES - GENERAL
PAINLEVEL_OUTOF10: 7
PAINLEVEL_OUTOF10: 2
PAINLEVEL_OUTOF10: 8
PAINLEVEL_OUTOF10: 3
PAINLEVEL_OUTOF10: 0

## 2021-10-28 ASSESSMENT — PAIN DESCRIPTION - PAIN TYPE: TYPE: CHRONIC PAIN

## 2021-10-28 ASSESSMENT — PAIN DESCRIPTION - LOCATION: LOCATION: BACK

## 2021-10-28 NOTE — DISCHARGE INSTR - COC
Continuity of Care Form    Patient Name: Dk Mariscal   :  1930  MRN:  [id number]    Admit date:  10/26/2021  Discharge date:  10/28/21

## 2021-10-28 NOTE — PROGRESS NOTES
ID Follow-up NOTE    CC:   R leg cellulitis, bacteremia  Antibiotics: Cefepime    Admit Date: 10/26/2021  Hospital Day: 3    Subjective:     Patient feels good. Objective:     Patient Vitals for the past 8 hrs:   BP Temp Temp src Pulse Resp SpO2 Weight   10/28/21 0553 -- -- -- -- -- -- 157 lb 13.6 oz (71.6 kg)   10/28/21 0543 (!) 161/58 97.6 °F (36.4 °C) Oral 67 18 91 % --     I/O last 3 completed shifts: In: 180 [P.O.:180]  Out: -   No intake/output data recorded. EXAM:  GENERAL: No apparent distress. RA  HEENT: Membranes moist, no oral lesion  NECK:  Supple, no lymphadenopathy  LUNGS: Clear b/l, no rales, no dullness  CARDIAC: RRR, no murmur appreciated  ABD:  + BS, soft / NT  EXT:  No rash, no edema, no lesions  R leg erythema, warm - less swelling than 10/28  NEURO: No focal neurologic findings  PSYCH: Orientation, sensorium, mood normal  LINES:  Peripheral iv       Data Review:  Lab Results   Component Value Date    WBC 6.3 10/28/2021    HGB 9.5 (L) 10/28/2021    HCT 28.5 (L) 10/28/2021    MCV 88.9 10/28/2021     10/28/2021     Lab Results   Component Value Date    CREATININE 1.4 (H) 10/28/2021    BUN 32 (H) 10/28/2021     10/28/2021    K 3.9 10/28/2021     (H) 10/28/2021    CO2 20 (L) 10/28/2021       Hepatic Function Panel:   Lab Results   Component Value Date    ALKPHOS 35 02/26/2019    ALT 15 02/26/2019    AST 11 02/26/2019    PROT 6.0 02/26/2019    PROT 6.6 04/30/2012    BILITOT 0.8 02/26/2019    LABALBU 4.1 02/26/2019       Micro:  10/26 BC - no growth to date  10/27 Fluid cult - GS 3+WBC, no organism seen    10/25 [facility] [[facility], [id number] from [address]   1 bottle with Enterobacter sakazakii + alpha hemolytic Streptococcus        Imaging:   10/27 R knee CT  Impression   Impression: Interval decrease in size of the right knee hematoma. A percutaneous drainage catheter is in appropriate position.       Mild skin thickening with subcutaneous infiltration, similar to prior. Correlate clinically for cellulitis.      10/27 IR fistulogram  . Aspiration of approximately 100 mL of serosanguineous fluid. There is completely decompressed the    collection under ultrasound. Fluid samples were sent for laboratory analysis for Gram stain, culture, cell count and differential.       Subsequently injection of 10 mL of dilute contrast opacifies the potential space of the collection. There is no communication with the joint space. This was then aspirated.       After conferring with Dr. Nina Gill, it was determined appropriate to remove the hematoma pigtail drain.  The drain was removed in its entirety and a sterile bandage placed       Scheduled Meds:   potassium chloride  10 mEq Oral Once    fluticasone  2 spray Nasal Daily    [Held by provider] furosemide  20 mg Oral Daily    [Held by provider] hydroCHLOROthiazide  25 mg Oral Daily    [Held by provider] metoprolol tartrate  12.5 mg Oral BID    atorvastatin  10 mg Oral Daily    sodium chloride flush  5-40 mL IntraVENous 2 times per day    insulin lispro  0-6 Units SubCUTAneous Q6H    cefepime  1,000 mg IntraVENous Q24H    tiotropium-olodaterol  2 puff Inhalation Daily       Continuous Infusions:   sodium chloride      dextrose      sodium chloride 100 mL/hr at 10/27/21 1701       PRN Meds:  sodium chloride flush, sodium chloride, ondansetron **OR** ondansetron, polyethylene glycol, acetaminophen **OR** acetaminophen, glucose, dextrose, glucagon (rDNA), dextrose      Assessment:     Hx CAD, HTN, DM, HL, hearing loss, cataract     R knee trauma / R leg hematoma   - drained surgically, drain placed 10/20   - NOT R knee septic arthritis      R leg cellulitis   - assoc with hematoma / drain   - drain removed 10/27   - improving      Bacteremia + Enterobacter sp, alpha hemolytic Streptococcus   - source R leg cellulitis      Plan:     Change to ceftriaxone  Will f/u on fluid cult      Ok for discharge - resume po cefdinir (rx 10/25 - should be

## 2021-10-28 NOTE — PROGRESS NOTES
Pt discharged with transport provided by son in law. IV and tele removed.  All belongings left with pt

## 2021-10-28 NOTE — DISCHARGE SUMMARY
INTERNAL MEDICINE DEPARTMENT AT [address] [place] [address]  DISCHARGE SUMMARY    Patient ID: Adán Zarate                                             Discharge Date: 10/28/2021   Patient's PCP: Carol Bermudez                                          Discharge Physician: Oj Wells MD MD  Admit Date: 10/26/2021   Admitting Physician: Bogdan Goldsmith MD    PROBLEMS DURING HOSPITALIZATION:  Present on Admission:   Bacteremia      DISCHARGE DIAGNOSES:  1. Bacteremia, with positive PCR for Enterobacteraciae, Streptococcus sp  2. R knee Cellulitis    HPI:    The following issues were addressed during hospitalization:    Adán Zarate is a 79 y/o F w/ PMHx of hx of lung adenocarcinoma of the R lung, CAD, DVT, CKD stage 3, COPD, T2DM, R knee hematoma who presents to ED with chills, as well as erythema and pain over the R knee of approximately 3 days duration. The patient stated she had a R knee hematoma secondary to trauma of the knee in July 2021 and has since had multiple drainages and visits with multiple specialists for the knee issue. Of note a drain was placed on 10/20 for the knee hematoma. Patient states that she initially visited [facility] for evaluation of the knee, blood cultures were obtained and she was discharged prophylactically on Cefdinir. Her blood culture resulted positive for PCR detected Streptococcus species and Enterobacteriaceae. The patient was recommended to come to the ED at [place] [address]. On arrival to the ED the patient's vital signs were stable. Labs were remarkable for Cr of 1.8 (baseline 1.3-1.5) and WBC of 13. 3. XR of the right knee showed no acute osseus abnormality. Patient was admitted to medical floor for further workup and care. Consults were made to Radiology, Plastic Surgery, and Infectious Diseases.  Radiology recommended obtaining CT imaging of the R knee which showed interval decrease in the size of her R knee hematoma and the percutaneous drainage catheter in appropriate position. The decision was made to remove the R knee drain which was done on 10/27, patient tolerated this well. ID saw the patient recommended discharge home on Cefdinir 300 mg BID for a total of 10 days. On the day of discharge the patient was AOx4 and near her baseline. R leg was bandaged. She worked with PT and OT and was deemed stable for discharge. She was counseled appropriately on plan, medications and appropriate follow-ups. She returned home in stable condition. Physical Exam:  BP (!) 198/74   Pulse 69   Temp 97 °F (36.1 °C) (Oral)   Resp 20   Ht 5' 4\" (1.626 m)   Wt 157 lb 13.6 oz (71.6 kg)   SpO2 96%   BMI 27.09 kg/m²     Physical Exam  Gen - Resting comfortably, no apparent distress. CVS - Regular rate, rhythm. No murmurs/gallops/rubs. Lungs - CTA b/l. No wheezes/crackles/rubs. Abdomen - Soft, non-distended, non-tender. Neuro - AOX3. CN II-XII grossly intact. Ext - R knee bandaged. Underneath bandages reduced erythema, warmth compared to initial admission. Consults: ID, Plastic Surgery, Radiology   Significant Diagnostic Studies: CXR, EKG, CT R Knee  Treatments: Cefepime  Disposition: Home   Discharged Condition: Stable  Follow Up: Primary Care Physician in 1 Week     DISCHARGE MEDICATION:     Medication List      START taking these medications    cefdinir 300 MG capsule  Commonly known as: OMNICEF  Take 1 capsule by mouth 2 times daily for 10 days        CHANGE how you take these medications    hydroCHLOROthiazide 25 MG tablet  Commonly known as: HYDRODIURIL  What changed: Another medication with the same name was removed. Continue taking this medication, and follow the directions you see here.         CONTINUE taking these medications    acetaminophen 325 MG tablet  Commonly known as: TYLENOL     aspirin 81 MG tablet     blood glucose test strips strip  Commonly known as: FREESTYLE LITE  Pt test once daily E11.9     calcium carbonate 600 MG Tabs tablet Compression Stockings Misc  by Does not apply route     FreeStyle Lancets Misc  TEST TWICE A DAY AS DIRECTED dx 250.00     furosemide 40 MG tablet  Commonly known as: LASIX     gabapentin 100 MG capsule  Commonly known as: NEURONTIN     metFORMIN 500 MG tablet  Commonly known as: GLUCOPHAGE     MULTIVITAMIN PO     pantoprazole 20 MG tablet  Commonly known as: PROTONIX     rivaroxaban 20 MG Tabs tablet  Commonly known as: XARELTO     simvastatin 20 MG tablet  Commonly known as: ZOCOR     umeclidinium-vilanterol 62.5-25 MCG/INH Aepb inhaler  Commonly known as: Anoro Ellipta  Inhale 1 puff into the lungs daily     vitamin B-12 500 MCG tablet  Commonly known as: CYANOCOBALAMIN     Vitamin D3 125 MCG (5000 UT) Tabs        STOP taking these medications    clopidogrel 75 MG tablet  Commonly known as: PLAVIX           Where to Get Your Medications      These medications were sent to [address] [place] [address]    Phone: [phone]   · cefdinir 300 MG capsule       Activity: As tolerated. Diet: Regular diet. Wound Care: None. Time Spent on discharge is more than 15 minutes. Signed:  Agnieszka Smith MD, PGY-1  Internal Medicine Resident  10/28/2021  Patient seen and examined, labs and imaging reviewed, agree with assessment and plan as outlined above. patients condition continues to improve doing well, asked patient to monitor side effects of medications which i've discussed at length, recurrence of symptoms or new symptoms including but not limited to chest pain, shortness of breath, nausea, vomiting, fevers or chills and seek immediate medical attention or call 911. Greater than 30 minutes spent on case on day of discharge.      Danish Mitchell MD FACP

## 2021-10-28 NOTE — PROGRESS NOTES
Physical Therapy    Facility/Department: [place] [address] 4 PCU  Initial Assessment and treatment    NAME: Noé Amador  : 1930  MRN: [id number]    Date of Service: 10/28/2021    Discharge Recommendations:Deysi Orozco scored a 23/24 on the AM-PAC short mobility form. At this time, no further PT is recommended upon discharge due to the patient being at their baseline level of mobility. Recommend patient returns to prior setting with prior services. PT Equipment Recommendations  Equipment Needed: No    Assessment   Assessment: The pt presents for bacteremia of R knee where she had hematoma after a fall. Pt typically lives in independent living. She appears to be functioning at her baseline level of function and has no need for therapy services at this time. Pt anticipates return home today. Treatment Diagnosis: impaired mobility 2/2 bacteremia  Decision Making: Low Complexity  PT Education: PT Role;Plan of Care;General Safety; Functional Mobility Training;Gait Training  Patient Education: pt verbalized understanding  REQUIRES PT FOLLOW UP: No  Activity Tolerance  Activity Tolerance: Patient Tolerated treatment well       Patient Diagnosis(es): The encounter diagnosis was Bacteremia due to Enterobacter species. has a past medical history of Diabetic neuropathy ([address] Gastritis, Hearing loss, Hyperlipidemia, Hypertension, MI (myocardial infarction) ([address] OA (osteoarthritis), S/P colonoscopy, Screening mammogram, and Type II or unspecified type diabetes mellitus without mention of complication, not stated as uncontrolled. has a past surgical history that includes angioplasty (); electrocardiogram (1985); electrocardiogram (1995); Cataract removal with implant (Right, [id number]); and eye surgery (Left, 10/13).     Restrictions  Position Activity Restriction  Other position/activity restrictions: up with assist  Vision/Hearing  Vision: Within Functional Limits (reading glasses)  Hearing: Exceptions to

## 2021-10-28 NOTE — PROGRESS NOTES
Physician Progress Note      Gaby Hopkins  CSN #:                  [id number]  :                       1930  ADMIT DATE:       10/26/2021 5:47 PM  [address] [place] DATE:  RESPONDING  PROVIDER #:        Marie Montejo          QUERY TEXT:    Pt admitted with bacteremia & cellulitis rt knee joint. ? Pt noted to be s/p rt   knee drain placement 10/20/21.? If possible, please document in progress   notes and discharge summary:    The medical record reflects the following:  Risk Factors: bacteremia, cellulitis rt knee, s/p rt knee drain placement for   chronic rt knee hematoma  Clinical Indicators:  patient reported that starting  she started having   subjective chills. Additionally patient noticed new redness and some   moderate, dull pain that sometime radiated down her leg. She reports that her   drain started to have less output a couple days ago, and has not had output   for a couple days. Treatment: Plastic surgery consult, ID consult, IR Consult, cefepime, IR   removed drain  Options provided:  -- bacteremia & cellulitis are likely a postoperative complication  -- bacteremia & cellulitis are not a postoperative complication, but are due   to other incidental risk factor, Please specify other incidental risk factor  -- Other - I will add my own diagnosis  -- Disagree - Not applicable / Not valid  -- Disagree - Clinically unable to determine / Unknown  -- Refer to Clinical Documentation Reviewer    PROVIDER RESPONSE TEXT:    Patient has bacteremiaand cellulitis likely as a postoperative complication.     Query created by: Elenita Ford on 10/28/2021 3:19 PM      Electronically signed by:  Marie Montejo 10/28/2021 3:58 PM

## 2021-10-28 NOTE — PROGRESS NOTES
Physician Progress Note      PATIENTSharlene Members  [facility] #:                  [id number]  :                       1930  ADMIT DATE:       10/26/2021 5:47 PM  [address] [place] DATE:  RESPONDING  PROVIDER #:        SHINE VAZHAPPILLY          QUERY TEXT:    Pt admitted with cellulitis and bacteremia. Pt noted to have DM 2. If   possible, please document in progress notes and discharge summary the   relationship, if any, between cellulitis and DM. The medical record reflects the following:  Risk Factors: 80 y.o. female w/chronic rt knee drainage, bacteremia, DM2, MAURIZIO   on CKD, 10/20/21 drain placeement rt knee  Clinical Indicators: Prt H&P: patient noticed new redness and some moderate,   dull pain that sometime radiated down her leg. Treatment: ID consult, bld cxs, imaging, Cefepime, SSI w/Insullin lispro   subcut q 6 hrs for DM2  Options provided:  -- Right knee cellulitis likely associated with Diabetes  -- Right knee cellulitis unrelated to Diabetes  -- Other - I will add my own diagnosis  -- Disagree - Not applicable / Not valid  -- Disagree - Clinically unable to determine / Unknown  -- Refer to Clinical Documentation Reviewer    PROVIDER RESPONSE TEXT:    Right knee cellulitis unrelated to Diabetes.     Query created by: Becca Nixon on 10/28/2021 3:05 PM      Electronically signed by:  Yan Pichardo 10/28/2021 5:41 PM

## 2021-10-28 NOTE — PROGRESS NOTES
Occupational Therapy   Occupational Therapy Initial Assessment/Tx and Discharge  Date: 10/28/2021   Patient Name: Noé Amador  MRN: [id number]     : 1930    Date of Service: 10/28/2021    Discharge Recommendations:  Noé Amador scored a 23/24 on the AM-[facility] ADL Inpatient form. At this time, no further OT is recommended upon discharge. Recommend patient returns to prior setting with prior services. OT Equipment Recommendations  Equipment Needed: No    Assessment   Assessment: Pt presents near her baseline for ADLs and mobility. Demo safe ambulation in room/hallway and ADLs using walker, as she uses at baseline. R knee swelling/pain did not limit pt activity. Pt near her baseline, will sign off OT. Anticipate pt return to apartment with assist from facility staff as needed  Decision Making: Low Complexity  REQUIRES OT FOLLOW UP: No  Activity Tolerance  Activity Tolerance: Patient Tolerated treatment well  Safety Devices  Safety Devices in place: Yes  Type of devices: Nurse notified; Left in chair;Call light within reach; Chair alarm in place               Restrictions  Position Activity Restriction  Other position/activity restrictions: up with assist    Subjective   General  Chart Reviewed: Yes  Patient assessed for rehabilitation services?: Yes  Additional Pertinent Hx: 79 y/o F w/ PMHx of CAD, CKD3, DM2, COPD, DVT, R knee hematoma who presents to ED after having a positive blood culture. The patient has a right knee hematoma w/ complicated history. patient had pigtail drain placed on 10/20. Developed fever and chills, admitted for IV antibiotics for Blood Culture returned positive for strep and enterobacter  Referring Practitioner: Jenny Schwartz MD  Diagnosis: bacteremia  Subjective  Subjective: Pt in bed, agreeable to therapy and eager to get out of bed \"I'm just over it.  I want to go home\"  Patient Currently in Pain: No    Social/Functional History  Social/Functional History  Lives With: Alone  Type of Home: Apartment ([facility])  Home Access: Elevator  Bathroom Shower/Tub: Walk-in shower  Bathroom Toilet: Handicap height  Bathroom Equipment: Hand-held shower, Grab bars in shower, Shower chair  Home Equipment: Rolling walker, 4 wheeled walker  ADL Assistance: [address]: Independent (makes simple breakfast and lunch, does her laundry and cleans, goes to dining room for dinner)  Ambulation Assistance: Independent (uses RW)  Transfer Assistance: Independent  Active : No  Patient's  Info: facility transport  Additional Comments: Denied recent falls. Reports R LE swelling since injury in July, has had several hospitalizations and interventions for her knee       Objective   Vision: Within Functional Limits (glasses for reading)  Hearing: Exceptions to [facility]  Hearing Exceptions: Hard of hearing/hearing concerns;Bilateral hearing aid    Orientation  Overall Orientation Status: Within Normal Limits     Balance  Sitting Balance: Independent  Standing Balance: Supervision  Functional Mobility  Functional - Mobility Device: Rolling Walker  Activity: To/from bathroom; Other (hallway)  Assist Level: Supervision  Functional Mobility Comments: pt steady, no LOB observed    Toilet Transfers  Toilet - Technique: Ambulating  Equipment Used: Standard toilet (grab bar)  Toilet Transfer: Supervision    ADL  Feeding: Independent  Grooming: Independent (oral care, wash hands, brush hair standing at sink)  LE Dressing: Modified independent  (don brief, don shoes with extra time to reach R LE due to swelling)  Toileting: Supervision           Bed mobility  Supine to Sit: Modified independent (HOB raised)  Scooting: Independent     Transfers  Sit to stand: Supervision  Stand to sit: Supervision        Cognition  Overall Cognitive Status: WFL         LUE AROM (degrees)  LUE AROM : WFL  RUE AROM (degrees)  RUE AROM : WFL  LUE Strength  Gross LUE Strength: WFL  RUE Strength  Gross RUE Strength: WFL         Activity Tolerance: stood ~10 minutes during ADLs then ambulated hallway without c/o fatigue, tolerated well          Plan   Plan  Times per week: d/c      AM-PAC Score        AM-[facility] Inpatient Daily Activity Raw Score: 23 (10/28/21 1249)  AM-PAC Inpatient ADL T-Scale Score : 51.12 (10/28/21 1249)  ADL Inpatient CMS 0-100% Score: 15.86 (10/28/21 1249)  ADL Inpatient CMS G-Code Modifier : CI (10/28/21 1249)      Therapy Time   Individual Concurrent Group Co-treatment   Time In 0820         Time Out 0901         Minutes 41         Timed Code Treatment Minutes: 26 Minutes +15 min aida Whalen, OT

## 2021-10-28 NOTE — PLAN OF CARE
Problem: Falls - Risk of:  Goal: Will remain free from falls  Description: Will remain free from falls  10/28/2021 1308 by Thao Yadav RN  Note: Pt is a Fall Risk. See Fidencio Yadav Fall Risk Score. Pt bed in low position and side rails up. Call light and belongings in reach. Pt encouraged to call for assistance. Will continue with hourly rounds for PO intake, pain needs, toileting, and repositioning as needed.

## 2021-10-28 NOTE — PROGRESS NOTES
will plan for compression wrap of RLE for comfort  - plastic surgery will continue to follow    [place] DO Ravi, PGY-1  10/28/21  6:40 AM  [phone]    I saw and independently examined the patient today. I agree with the history of present illness, past medical/surgical histories, family history, social history, medication list and allergies as listed. The review of systems is as noted above. My physical exam confirms the findings listed above. Review of labs, pathology reports, radiology reports and medical records confirm the findings noted above. I edited the note where appropriate in italics, strikethrough font, or underline. Continue with local wound care with compression for the lower extremity. No acute surgical indications at this time. Appreciate ID recommendations.     Paulina Velasco MD  [address] [place] [address] Reconstructive Surgery  [phone]  10/28/21

## 2021-10-29 NOTE — CARE COORDINATION
CTN contacted Dr Delcia Meckel office for home care verbal order. Dr Nuzhat Hendrix stated (per his nurse) that he will not sign any orders for this patient until she is seen in his office again. Patient was last seen 10/19. CTN contacted Dr Manny Thomas office to see if she is active with him, but patient has been discharged from Dr [facility] services. Home Care is not able to be set up until patient is seen in Dr Delcia Meckel office. Patient states that she will get an appointment asap.  Electronically signed by Zoila Hamilton LPN on 17/85/6964 at 10:57 AM

## 2021-10-29 NOTE — TELEPHONE ENCOUNTER
Pt was sent home from hospital.  Nurse at facility will check on her and see what they need from us moving forward. They will call us back to update us.

## 2021-10-30 LAB
BLOOD CULTURE, ROUTINE: NORMAL
CULTURE, BLOOD 2: NORMAL

## 2021-10-31 LAB
BODY FLUID CULTURE, STERILE: ABNORMAL
GRAM STAIN RESULT: ABNORMAL
ORGANISM: ABNORMAL

## 2021-11-09 ENCOUNTER — OFFICE VISIT (OUTPATIENT)
Dept: SURGERY | Age: 86
End: 2021-11-09
Payer: MEDICARE

## 2021-11-09 VITALS
BODY MASS INDEX: 27.29 KG/M2 | HEART RATE: 74 BPM | WEIGHT: 159 LBS | OXYGEN SATURATION: 98 % | DIASTOLIC BLOOD PRESSURE: 77 MMHG | SYSTOLIC BLOOD PRESSURE: 150 MMHG

## 2021-11-09 DIAGNOSIS — E11.69 TYPE 2 DIABETES MELLITUS WITH OTHER SPECIFIED COMPLICATION, UNSPECIFIED WHETHER LONG TERM INSULIN USE (HCC): ICD-10-CM

## 2021-11-09 DIAGNOSIS — M25.461 FLUID IN RIGHT KNEE JOINT: Primary | ICD-10-CM

## 2021-11-09 PROCEDURE — 1090F PRES/ABSN URINE INCON ASSESS: CPT | Performed by: SURGERY

## 2021-11-09 PROCEDURE — 4040F PNEUMOC VAC/ADMIN/RCVD: CPT | Performed by: SURGERY

## 2021-11-09 PROCEDURE — 1123F ACP DISCUSS/DSCN MKR DOCD: CPT | Performed by: SURGERY

## 2021-11-09 PROCEDURE — G8484 FLU IMMUNIZE NO ADMIN: HCPCS | Performed by: SURGERY

## 2021-11-09 PROCEDURE — G8427 DOCREV CUR MEDS BY ELIG CLIN: HCPCS | Performed by: SURGERY

## 2021-11-09 PROCEDURE — 1111F DSCHRG MED/CURRENT MED MERGE: CPT | Performed by: SURGERY

## 2021-11-09 PROCEDURE — 99213 OFFICE O/P EST LOW 20 MIN: CPT | Performed by: SURGERY

## 2021-11-09 PROCEDURE — G8417 CALC BMI ABV UP PARAM F/U: HCPCS | Performed by: SURGERY

## 2021-11-09 PROCEDURE — 1036F TOBACCO NON-USER: CPT | Performed by: SURGERY

## 2021-11-09 NOTE — PROGRESS NOTES
[facility]    CC: Right knee fluid    Referring Physician: Dr. Nora Rose [facility])    HPI: This is an 80 y. o.female with a PMHx as delineated below who presents to clinic in consultation for right knee swelling. She fell from standing . She noted edema without any wound. There was a significant amount of fluid noted and she was seen by Orthopedics at [facility] who performed multiple aspirations with return of fluid. She was referred to [facility] and subsequently referred to [facility] for evaluation. Since her last evaluation, she underwent IR aspiration and was found to have bacteremia treated with antibiotics per IM/ID services. She presents back for evaluation. PMHx:   Past Medical History:   Diagnosis Date    Diabetic neuropathy ([address]     Gastritis     Hearing loss     bilateral hearing aids    Hyperlipidemia     Hypertension     MI (my[facility] infarction) [address] [address] 1986    Angioplasty    OA (osteoarthritis)     S/P colonoscopy 2009    Screening mammogram 2009    Due 1 yr    Type II or unspecified type diabetes mellitus without mention of complication, not stated as uncontrolled    HgbA1c - PENDING    PSHx:   Past Surgical History:   Procedure Laterality Date    ANGIOPLASTY      CATARACT REMOVAL WITH IMPLANT Right [id number]    ELECTROCARDIOGRAM  1985    ELECTROCARDIOGRAM  1995    EYE SURGERY Left 10/13    phaco with iol     Allergy:   Allergies   Allergen Reactions    Erythromycin      Upset stomach      Penicillins Swelling       SHx:   Social History     Socioeconomic History    Marital status:       Spouse name: Not on file    Number of children: Not on file    Years of education: Not on file    Highest education level: Not on file   Occupational History    Not on file   Tobacco Use    Smoking status: Former Smoker     Quit date: 9/10/1983     Years since quittin.1    Smokeless tobacco: Never Used   Substance and Sexual Activity    Alcohol use: Yes     Alcohol/week: 1.0 standard drink     Types: 1 Glasses of wine per week     Comment: 1 x week    Drug use: No    Sexual activity: Not on file   Other Topics Concern    Not on file   Social History Narrative    Not on file     Social Determinants of Health     Financial Resource Strain:     Difficulty of Paying Living Expenses: Not on file   Food Insecurity:     Worried About Running Out of Food in the Last Year: Not on file    Francine of Food in the Last Year: Not on file   Transportation Needs:     Lack of Transportation (Medical): Not on file    Lack of Transportation (Non-Medical):  Not on file   Physical Activity:     Days of Exercise per Week: Not on file    Minutes of Exercise per Session: Not on file   Stress:     Feeling of Stress : Not on file   Social Connections:     Frequency of Communication with Friends and Family: Not on file    Frequency of Social Gatherings with Friends and Family: Not on file    Attends Mandaeism Services: Not on file    Active Member of [address] [place] [address] or Organizations: Not on file    Attends Club or Organization Meetings: Not on file    Marital Status: Not on file   Intimate Partner Violence:     Fear of Current or Ex-Partner: Not on file    Emotionally Abused: Not on file    Physically Abused: Not on file    Sexually Abused: Not on file   Housing Stability:     Unable to Pay for Housing in the Last Year: Not on file    Number of [place] in the Last Year: Not on file    Unstable Housing in the Last Year: Not on file     FHx: Family history of skin CA: None    Meds:   Current Outpatient Medications   Medication Sig Dispense Refill    Probiotic Acidophilus (FLORANEX) TABS Take 1 tablet by mouth 2 times daily 60 tablet 0    metFORMIN (GLUCOPHAGE) 500 MG tablet Take 500 mg by mouth daily (with breakfast)      rivaroxaban (XARELTO) 20 MG TABS tablet Take 20 mg by mouth daily      gabapentin (NEURONTIN) 100 MG capsule Take 100 mg by mouth every evening.  simvastatin (ZOCOR) 20 MG tablet Take 20 mg by mouth nightly      pantoprazole (PROTONIX) 20 MG tablet Take 20 mg by mouth daily      vitamin B-12 (CYANOCOBALAMIN) 500 MCG tablet Take 500 mcg by mouth daily      Cholecalciferol (VITAMIN D3) 125 MCG (5000 UT) TABS Take 10,000 Units by mouth      furosemide (LASIX) 40 MG tablet Take 40 mg by mouth daily       acetaminophen (TYLENOL) 325 MG tablet Take 650 mg by mouth every 6 hours as needed for Pain      calcium carbonate 600 MG TABS tablet Take 1 tablet by mouth daily Indications: Patient takes 200mg as needed      umeclidinium-vilanterol (ANORO ELLIPTA) 62.5-25 MCG/INH AEPB inhaler Inhale 1 puff into the lungs daily 1 each 0    blood glucose test strips (FREESTYLE LITE) strip Pt test once daily E11.9 100 strip 1    aspirin 81 MG tablet Take 81 mg by mouth daily      Compression Stockings MISC by Does not apply route 1 each 0    FREESTYLE LANCETS MISC TEST TWICE A DAY AS DIRECTED dx 250.00 100 each 3    Multiple Vitamin (MULTIVITAMIN PO) Take  by mouth daily. No current facility-administered medications for this visit. ROS   Constitutional: Negative for chills and fever. HENT: Negative for congestion, facial swelling, and voice change. Eyes: Negative for photophobia and visual disturbance. Respiratory: Negative for apnea, cough, chest tightness and shortness of breath. Cardiovascular: Negative for chest pain and palpitations. Gastrointestinal: Negative for dysphagia and early satiety. Genitourinary: Negative for difficulty urinating, dysuria, flank pain, frequency and hematuria. Musculoskeletal: Negative for new gait problem, joint swelling and myalgias. Skin: Negative for color change, pallor and rash. Endocrine: negative for tremors, temperature intolerance or polydipsia. Allergic/Immunologic: Negative for new environmental or food allergies.   Neurological: Negative for dizziness, seizures, speech difficulty, numbness. Hematological: Negative for adenopathy. Psychiatric/Behavioral: Negative for agitation and confusion. EXAM    BP (!) 150/77   Pulse 74   Wt 159 lb (72.1 kg)   SpO2 98%   BMI 27.29 kg/m²     GEN: NAD, pleasant, appears stated age  CVS: RRR  PULM: No respiratory distress  HEENT: PERRLA/EOMI; hearing appears within normal limits  NECK: Supple with trachea in midline, no masses  EXT: Large fluid collection on the medial aspect of right knee  ABD: soft/NT/ND  NEURO: No focal deficits, no obvious CN deficits  PSYCH: Mood appropriate    PATHOLOGY/WORKUP: None    IMP: 80 y. o.female with R knee fluid. PLAN: She has completed her antibiotic course and will give her 2 weeks to ensure she does not become septic again. She will return in 2 weeks and if she has done well, will discuss intervention options. As discussed previously, surgical intervention only if soft tissue defect develops - given comorbid condition, would only perform as last resort. The patient was counseled at length about the risks of ольга Covid-19 during their perioperative period and any recovery window from their procedure. The patient was made aware that ольга Covid-19  may worsen their prognosis for recovering from their procedure  and lend to a higher morbidity and/or mortality risk. All material risks, benefits, and reasonable alternatives including postponing the procedure were discussed. The patient does wish to proceed with the procedure at this time.     Marcie Funk MD  [address] Reconstructive Surgery  [phone]  11/09/21

## 2021-11-24 ENCOUNTER — OFFICE VISIT (OUTPATIENT)
Dept: SURGERY | Age: 86
End: 2021-11-24
Payer: MEDICARE

## 2021-11-24 VITALS — OXYGEN SATURATION: 99 % | BODY MASS INDEX: 27.29 KG/M2 | HEART RATE: 68 BPM | WEIGHT: 159 LBS

## 2021-11-24 DIAGNOSIS — M25.461 FLUID IN RIGHT KNEE JOINT: Primary | ICD-10-CM

## 2021-11-24 PROCEDURE — G8427 DOCREV CUR MEDS BY ELIG CLIN: HCPCS | Performed by: SURGERY

## 2021-11-24 PROCEDURE — 1123F ACP DISCUSS/DSCN MKR DOCD: CPT | Performed by: SURGERY

## 2021-11-24 PROCEDURE — 1111F DSCHRG MED/CURRENT MED MERGE: CPT | Performed by: SURGERY

## 2021-11-24 PROCEDURE — 99213 OFFICE O/P EST LOW 20 MIN: CPT | Performed by: SURGERY

## 2021-11-24 PROCEDURE — 1036F TOBACCO NON-USER: CPT | Performed by: SURGERY

## 2021-11-24 PROCEDURE — G8417 CALC BMI ABV UP PARAM F/U: HCPCS | Performed by: SURGERY

## 2021-11-24 PROCEDURE — G8484 FLU IMMUNIZE NO ADMIN: HCPCS | Performed by: SURGERY

## 2021-11-24 PROCEDURE — 1090F PRES/ABSN URINE INCON ASSESS: CPT | Performed by: SURGERY

## 2021-11-24 PROCEDURE — 4040F PNEUMOC VAC/ADMIN/RCVD: CPT | Performed by: SURGERY

## 2021-11-24 NOTE — PROGRESS NOTES
[facility]    CC: Right knee fluid    Referring Physician: Dr. Ruma Blanc [facility])    HPI: This is an 80 y. o.female with a PMHx as delineated below who presents to clinic in consultation for right knee swelling. She fell from standing 7/21. She noted edema without any wound. There was a significant amount of fluid noted and she was seen by Orthopedics at [facility] who performed multiple aspirations with return of fluid. She was referred to [facility] and subsequently referred to [facility] for evaluation. She underwent IR aspiration and was found to have bacteremia treated with antibiotics per IM/ID services. Since her last evaluation, she feels much better. She notes that her mass has returned in full size. PMHx:   Past Medical History:   Diagnosis Date    Diabetic neuropathy ([facility] [address]     Gastritis     Hearing loss     bilateral hearing aids    Hyperlipidemia     Hypertension     MI (myocardial infarction) ([facility] [address] April 1986    Angioplasty    OA (osteoarthritis)     S/P colonoscopy 8-    Screening mammogram 12-    Due 1 yr    Type II or unspecified type diabetes mellitus without mention of complication, not stated as uncontrolled    HgbA1c - 6.7 (10/21)    PSHx:   Past Surgical History:   Procedure Laterality Date    ANGIOPLASTY  1986    CATARACT REMOVAL WITH IMPLANT Right [id number]    ELECTROCARDIOGRAM  2/1985    ELECTROCARDIOGRAM  5/1995    EYE SURGERY Left 10/13    phaco with iol     Allergy:   Allergies   Allergen Reactions    Erythromycin      Upset stomach      Penicillins Swelling       SHx:   Social History     Socioeconomic History    Marital status:       Spouse name: Not on file    Number of children: Not on file    Years of education: Not on file    Highest education level: Not on file   Occupational History    Not on file   Tobacco Use    Smoking status: Former Smoker     Quit date: 9/10/1983     Years since quittin.2    Smokeless tobacco: Never Used   Substance and Sexual Activity    Alcohol use: Yes     Alcohol/week: 1.0 standard drink     Types: 1 Glasses of wine per week     Comment: 1 x week    Drug use: No    Sexual activity: Not on file   Other Topics Concern    Not on file   Social History Narrative    Not on file     Social Determinants of Health     Financial Resource Strain:     Difficulty of Paying Living Expenses: Not on file   Food Insecurity:     Worried About Running Out of Food in the Last Year: Not on file    Francine of Food in the Last Year: Not on file   Transportation Needs:     Lack of Transportation (Medical): Not on file    Lack of Transportation (Non-Medical):  Not on file   Physical Activity:     Days of Exercise per Week: Not on file    Minutes of Exercise per Session: Not on file   Stress:     Feeling of Stress : Not on file   Social Connections:     Frequency of Communication with Friends and Family: Not on file    Frequency of Social Gatherings with Friends and Family: Not on file    Attends [employer] Services: Not on file    Active Member of [address] [place] [address] or Organizations: Not on file    Attends Club or Organization Meetings: Not on file    Marital Status: Not on file   Intimate Partner Violence:     Fear of Current or Ex-Partner: Not on file    Emotionally Abused: Not on file    Physically Abused: Not on file    Sexually Abused: Not on file   Housing Stability:     Unable to Pay for Housing in the Last Year: Not on file    Number of [place] in the Last Year: Not on file    Unstable Housing in the Last Year: Not on file     FHx: Family history of skin CA: None    Meds:   Current Outpatient Medications   Medication Sig Dispense Refill    Probiotic Acidophilus (FLORANEX) TABS Take 1 tablet by mouth 2 times daily 60 tablet 0    metFORMIN (GLUCOPHAGE) 500 MG tablet Take 500 mg by mouth daily (with breakfast)      rivaroxaban (XARELTO) 20 MG TABS tablet Take 20 mg by mouth daily      gabapentin (NEURONTIN) 100 MG capsule Take 100 mg by mouth every evening.  simvastatin (ZOCOR) 20 MG tablet Take 20 mg by mouth nightly      pantoprazole (PROTONIX) 20 MG tablet Take 20 mg by mouth daily      vitamin B-12 (CYANOCOBALAMIN) 500 MCG tablet Take 500 mcg by mouth daily      Cholecalciferol (VITAMIN D3) 125 MCG (5000 UT) TABS Take 10,000 Units by mouth      furosemide (LASIX) 40 MG tablet Take 40 mg by mouth daily       acetaminophen (TYLENOL) 325 MG tablet Take 650 mg by mouth every 6 hours as needed for Pain      calcium carbonate 600 MG TABS tablet Take 1 tablet by mouth daily Indications: Patient takes 200mg as needed      umeclidinium-vilanterol (ANORO ELLIPTA) 62.5-25 MCG/INH AEPB inhaler Inhale 1 puff into the lungs daily 1 each 0    blood glucose test strips (FREESTYLE LITE) strip Pt test once daily E11.9 100 strip 1    aspirin 81 MG tablet Take 81 mg by mouth daily      Compression Stockings MISC by Does not apply route 1 each 0    FREESTYLE LANCETS MISC TEST TWICE A DAY AS DIRECTED dx 250.00 100 each 3    Multiple Vitamin (MULTIVITAMIN PO) Take  by mouth daily. No current facility-administered medications for this visit. ROS   Constitutional: Negative for chills and fever. HENT: Negative for congestion, facial swelling, and voice change. Eyes: Negative for photophobia and visual disturbance. Respiratory: Negative for apnea, cough, chest tightness and shortness of breath. Cardiovascular: Negative for chest pain and palpitations. Gastrointestinal: Negative for dysphagia and early satiety. Genitourinary: Negative for difficulty urinating, dysuria, flank pain, frequency and hematuria. Musculoskeletal: Negative for new gait problem, joint swelling and myalgias. Skin: Negative for color change, pallor and rash. Endocrine: negative for tremors, temperature intolerance or polydipsia.   Allergic/Immunologic: Negative for new environmental or food allergies. Neurological: Negative for dizziness, seizures, speech difficulty, numbness. Hematological: Negative for adenopathy. Psychiatric/Behavioral: Negative for agitation and confusion. EXAM    Pulse 68   Wt 159 lb (72.1 kg)   SpO2 99%   BMI 27.29 kg/m²     GEN: NAD, pleasant, appears stated age  CVS: RRR  PULM: No respiratory distress  HEENT: PERRLA/EOMI; hearing appears within normal limits  NECK: Supple with trachea in midline, no masses  EXT: Large fluid collection on the medial aspect of right knee  ABD: soft/NT/ND  NEURO: No focal deficits, no obvious CN deficits  PSYCH: Mood appropriate    PATHOLOGY/WORKUP: None    IMP: 80 y. o.female with R knee fluid. PLAN: We again discussed options and she does not want to have surgical intervention if avoidable. She is also nervous about catheter drainage and sclerosis given her previous infection. She will determine if she would like to proceed with IR drainage and then contact the office. The patient was counseled at length about the risks of ольга Covid-19 during their perioperative period and any recovery window from their procedure. The patient was made aware that ольга Covid-19  may worsen their prognosis for recovering from their procedure  and lend to a higher morbidity and/or mortality risk. All material risks, benefits, and reasonable alternatives including postponing the procedure were discussed. The patient does wish to proceed with the procedure at this time.     Chemo Reeves MD  [address] [place] [address] Reconstructive Surgery  [phone]  11/24/21

## 2021-11-29 ENCOUNTER — TELEPHONE (OUTPATIENT)
Dept: SURGERY | Age: 86
End: 2021-11-29

## 2021-11-29 DIAGNOSIS — M25.461 FLUID IN RIGHT KNEE JOINT: Primary | ICD-10-CM

## 2021-11-29 NOTE — TELEPHONE ENCOUNTER
Pt called back to speak to Yassine Cervantes about her drain placement. She is ready to schedule but has some questions. Please call her back when possible.

## 2021-11-29 NOTE — TELEPHONE ENCOUNTER
Patient called stating she would like to proceed with the drain for her knee and she has some other questions concerning the drain, she would like a call back from [place].      She has Physical therapy and wont be back home until after 230pm.

## 2021-11-30 RX ORDER — CIPROFLOXACIN 500 MG/1
250 TABLET, FILM COATED ORAL 2 TIMES DAILY
Qty: 20 TABLET | Refills: 0 | Status: SHIPPED | OUTPATIENT
Start: 2021-11-30 | End: 2021-12-10

## 2021-11-30 NOTE — TELEPHONE ENCOUNTER
Patient set up for IR cath placement at [facility] on 12/6 with an arrival time of 6. Patient informed of this and all questions answered.

## 2021-11-30 NOTE — TELEPHONE ENCOUNTER
Patient scheduled at [facility] IR on Monday 12/6/21 at 36, 11am arrival.     Ordered Placed.

## 2021-12-06 ENCOUNTER — HOSPITAL ENCOUNTER (OUTPATIENT)
Dept: CT IMAGING | Age: 86
Discharge: HOME | End: 2021-12-06
Payer: MEDICARE

## 2021-12-06 DIAGNOSIS — M25.461 FLUID IN RIGHT KNEE JOINT: ICD-10-CM

## 2021-12-06 PROCEDURE — 87205 SMEAR GRAM STAIN: CPT

## 2021-12-06 PROCEDURE — 2709999900 CT ABSCESS DRAINAGE W CATH PLACEMENT S&I

## 2021-12-06 PROCEDURE — 87070 CULTURE OTHR SPECIMN AEROBIC: CPT

## 2021-12-06 PROCEDURE — 87076 CULTURE ANAEROBE IDENT EACH: CPT

## 2021-12-06 PROCEDURE — 87075 CULTR BACTERIA EXCEPT BLOOD: CPT

## 2021-12-07 ENCOUNTER — TELEPHONE (OUTPATIENT)
Dept: SURGERY | Age: 86
End: 2021-12-07

## 2021-12-07 DIAGNOSIS — M25.461 FLUID IN RIGHT KNEE JOINT: Primary | ICD-10-CM

## 2021-12-07 NOTE — TELEPHONE ENCOUNTER
Attempted to call patient. Number is \"Not in service\". [phone]    Called pateint's E[facility] and LM to verify correct.

## 2021-12-09 NOTE — TELEPHONE ENCOUNTER
Patient last seen on 11/24/21 with below plan:   IMP: 80 y. o.female with R knee fluid. PLAN: We again discussed options and she does not want to have surgical intervention if avoidable. She is also nervous about catheter drainage and sclerosis given her previous infection. She will determine if she would like to proceed with IR drainage and then contact the office. Discussed with MD in office. 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 Patient is too high of risk for surgical intervention. Called patient to discuss this option. Patient would like to consult with radiology about this option 1st prior to moving forward. Radiology will need to be contacted to discuss how to schedule this consultation. Contacted Radiology at [phone] and left message to discuss.

## 2021-12-09 NOTE — TELEPHONE ENCOUNTER
Pt called in to speak to a nurse and discuss a treatment plan.     Please call her back at [phone]

## 2021-12-11 LAB — ANAEROBIC CULTURE: NORMAL

## 2021-12-13 NOTE — TELEPHONE ENCOUNTER
Called IR to place order and discussed patient's needs and requests. Nurse Crowe Henry will look over case and will call be back to discuss orders, scheduling and such. Holding call till I hear back from IR.

## 2021-12-13 NOTE — TELEPHONE ENCOUNTER
Fabienne Squires from [facility] called back wanting to discuss more information on order. She asked for a jasmeet back when there is time. Contact [phone].

## 2021-12-15 RX ORDER — ALCOHOL 0.98 ML/ML
50 INJECTION INTRASPINAL ONCE
Status: DISCONTINUED | OUTPATIENT
Start: 2021-12-15 | End: 2021-12-15

## 2021-12-16 ENCOUNTER — HOSPITAL ENCOUNTER (OUTPATIENT)
Dept: INTERVENTIONAL RADIOLOGY/VASCULAR | Age: 86
Discharge: HOME | End: 2021-12-16
Payer: MEDICARE

## 2021-12-16 VITALS
DIASTOLIC BLOOD PRESSURE: 74 MMHG | HEART RATE: 63 BPM | BODY MASS INDEX: 24.91 KG/M2 | WEIGHT: 155 LBS | SYSTOLIC BLOOD PRESSURE: 163 MMHG | OXYGEN SATURATION: 97 % | TEMPERATURE: 97.9 F | HEIGHT: 66 IN

## 2021-12-16 DIAGNOSIS — L02.91 ABSCESS: ICD-10-CM

## 2021-12-16 PROCEDURE — 6360000002 HC RX W HCPCS: Performed by: RADIOLOGY

## 2021-12-16 PROCEDURE — C1769 GUIDE WIRE: HCPCS

## 2021-12-16 PROCEDURE — 75984 XRAY CONTROL CATHETER CHANGE: CPT

## 2021-12-16 PROCEDURE — 2500000003 HC RX 250 WO HCPCS

## 2021-12-16 PROCEDURE — 6360000002 HC RX W HCPCS

## 2021-12-16 PROCEDURE — 2709999900 HC NON-CHARGEABLE SUPPLY

## 2021-12-16 PROCEDURE — 2580000003 HC RX 258

## 2021-12-16 PROCEDURE — 49423 EXCHANGE DRAINAGE CATHETER: CPT

## 2021-12-16 PROCEDURE — 6360000004 HC RX CONTRAST MEDICATION: Performed by: RADIOLOGY

## 2021-12-16 PROCEDURE — 49185 SCLEROTX FLUID COLLECTION: CPT

## 2021-12-16 PROCEDURE — C1729 CATH, DRAINAGE: HCPCS

## 2021-12-16 RX ORDER — ALCOHOL 0.98 ML/ML
50 INJECTION INTRASPINAL ONCE
Status: COMPLETED | OUTPATIENT
Start: 2021-12-16 | End: 2021-12-16

## 2021-12-16 RX ADMIN — IOHEXOL 50 ML: 350 INJECTION, SOLUTION INTRAVENOUS at 11:28

## 2021-12-16 RX ADMIN — ALCOHOL 50 ML: 0.98 INJECTION INTRASPINAL at 11:29

## 2021-12-16 NOTE — H&P
Patient:  Xiomara Manzo   :   1930      Relevant patient history reviewed and discussed. The procedure including risks and benefits was discussed at length with the patient (or designated family member) and all questions were answered. Informed consent to proceed with the procedure was given. Condition : stable    [facility] nurses notes reviewed and agreed. Medications reviewed. Allergies:    Allergies   Allergen Reactions    Erythromycin      Upset stomach      Penicillins Swelling

## 2021-12-16 NOTE — PROCEDURES
IR Brief Postoperative Note    Ernie Segal  YOB: 1930  [id number]    Pre-operative Diagnosis: recurrent seroma    Post-operative Diagnosis: Same    Procedure: abscessogram with RLE etoh sclerosis    Anesthesia: local    Surgeons/Assistants: karen    Estimated Blood Loss: Minimal    Complications: none    Specimens: were not obtained    See full procedure dictation to follow      Jaylan Luna MD MD  12/16/2021

## 2021-12-22 LAB
BODY FLUID CULTURE, STERILE: ABNORMAL
GRAM STAIN RESULT: ABNORMAL
ORGANISM: ABNORMAL

## 2021-12-22 NOTE — PRE-PROCEDURE INSTRUCTIONS
Attempted to call patient about procedure. No answer. Voicemail left. Told to be here at 0930 for procedure at 1100. NPO after midnight, but can take morning medication with sips of water. Office should have told them when and if they should stop any blood thinners. Told to have a responsible adult be with the patient to take them home and stay with them afterwards, if they are not admitted to hospital afterwards. And if available bring current list of medications.

## 2021-12-23 ENCOUNTER — HOSPITAL ENCOUNTER (OUTPATIENT)
Dept: INTERVENTIONAL RADIOLOGY/VASCULAR | Age: 86
Discharge: HOME | End: 2021-12-23
Payer: MEDICARE

## 2021-12-23 ENCOUNTER — HOSPITAL ENCOUNTER (OUTPATIENT)
Dept: ULTRASOUND IMAGING | Age: 86
Discharge: HOME | End: 2021-12-23
Payer: MEDICARE

## 2021-12-23 VITALS — WEIGHT: 155 LBS | HEIGHT: 66 IN | BODY MASS INDEX: 24.91 KG/M2 | TEMPERATURE: 97.9 F

## 2021-12-23 DIAGNOSIS — R22.41 LUMP OF SKIN OF RIGHT LOWER EXTREMITY: ICD-10-CM

## 2021-12-23 DIAGNOSIS — L02.91 ABSCESS: ICD-10-CM

## 2021-12-23 PROCEDURE — 2500000003 HC RX 250 WO HCPCS

## 2021-12-23 PROCEDURE — 6360000002 HC RX W HCPCS

## 2021-12-23 PROCEDURE — 76882 US LMTD JT/FCL EVL NVASC XTR: CPT

## 2021-12-23 PROCEDURE — 6360000004 HC RX CONTRAST MEDICATION: Performed by: RADIOLOGY

## 2021-12-23 PROCEDURE — 49185 SCLEROTX FLUID COLLECTION: CPT

## 2021-12-23 RX ADMIN — IOHEXOL 50 ML: 350 INJECTION, SOLUTION INTRAVENOUS at 13:36

## 2021-12-29 ENCOUNTER — OFFICE VISIT (OUTPATIENT)
Dept: SURGERY | Age: 86
End: 2021-12-29
Payer: MEDICARE

## 2021-12-29 VITALS
BODY MASS INDEX: 24.91 KG/M2 | OXYGEN SATURATION: 98 % | WEIGHT: 155 LBS | HEART RATE: 85 BPM | DIASTOLIC BLOOD PRESSURE: 76 MMHG | HEIGHT: 66 IN | SYSTOLIC BLOOD PRESSURE: 155 MMHG

## 2021-12-29 DIAGNOSIS — M25.461 FLUID IN RIGHT KNEE JOINT: Primary | ICD-10-CM

## 2021-12-29 PROCEDURE — 1123F ACP DISCUSS/DSCN MKR DOCD: CPT | Performed by: SURGERY

## 2021-12-29 PROCEDURE — 4040F PNEUMOC VAC/ADMIN/RCVD: CPT | Performed by: SURGERY

## 2021-12-29 PROCEDURE — G8417 CALC BMI ABV UP PARAM F/U: HCPCS | Performed by: SURGERY

## 2021-12-29 PROCEDURE — G8427 DOCREV CUR MEDS BY ELIG CLIN: HCPCS | Performed by: SURGERY

## 2021-12-29 PROCEDURE — 1036F TOBACCO NON-USER: CPT | Performed by: SURGERY

## 2021-12-29 PROCEDURE — G8484 FLU IMMUNIZE NO ADMIN: HCPCS | Performed by: SURGERY

## 2021-12-29 PROCEDURE — 1090F PRES/ABSN URINE INCON ASSESS: CPT | Performed by: SURGERY

## 2021-12-29 PROCEDURE — 99213 OFFICE O/P EST LOW 20 MIN: CPT | Performed by: SURGERY

## 2021-12-29 NOTE — PROGRESS NOTES
[facility]    CC: Right knee fluid    Referring Physician: Dr. Marcia CAVAZOS [facility])    HPI: This is an 80 y. o.female with a PMHx as delineated below who presents to clinic in consultation for right knee swelling. She fell from standing 7/21. She noted edema without any wound. There was a significant amount of fluid noted and she was seen by Orthopedics at [facility] who performed multiple aspirations with return of fluid. She was [place] [facility] and subsequently referred to [facility] for evaluation. She underwent IR aspiration and was found to have bacteremia treated with antibiotics per IM/ID services. She feels much better. She notes that her mass has returned in full size. Since her last evaluation, she has had ETOH sclerosis x 2 and notes that her drain output has decreased. She also has some intermittent discomfort. PMHx:   Past Medical History:   Diagnosis Date    Diabetic neuropathy ([facility]     Gastritis     Hearing loss     bilateral hearing aids    Hyperlipidemia     Hypertension     MI (myocardial infarction) ([facility] April 1986    Angioplasty    OA (osteoarthritis)     S/P colonoscopy 8-    Screening mammogram 12-    Due 1 yr    Type II or unspecified type diabetes mellitus without mention of complication, not stated as uncontrolled    HgbA1c - 6.7 (10/21)    PSHx:   Past Surgical History:   Procedure Laterality Date    ANGIOPLASTY  1986    CATARACT REMOVAL WITH IMPLANT Right [id number]    ELECTROCARDIOGRAM  2/1985    ELECTROCARDIOGRAM  5/1995    EYE SURGERY Left 10/13    phaco with iol     Allergy:   Allergies   Allergen Reactions    Erythromycin      Upset stomach      Penicillins Swelling       SHx:   Social History     Socioeconomic History    Marital status:       Spouse name: Not on file    Number of children: Not on file    Years of education: Not on file    Highest education level: Not on file Occupational History    Not on file   Tobacco Use    Smoking status: Former Smoker     Quit date: 9/10/1983     Years since quittin.3    Smokeless tobacco: Never Used   Substance and Sexual Activity    Alcohol use: Yes     Alcohol/week: 1.0 standard drink     Types: 1 Glasses of wine per week     Comment: 1 x week    Drug use: No    Sexual activity: Not on file   Other Topics Concern    Not on file   Social History Narrative    Not on file     Social Determinants of Health     Financial Resource Strain:     Difficulty of Paying Living Expenses: Not on file   Food Insecurity:     Worried About Running Out of Food in the Last Year: Not on file    Francine of Food in the Last Year: Not on file   Transportation Needs:     Lack of Transportation (Medical): Not on file    Lack of Transportation (Non-Medical):  Not on file   Physical Activity:     Days of Exercise per Week: Not on file    Minutes of Exercise per Session: Not on file   Stress:     Feeling of Stress : Not on file   Social Connections:     Frequency of Communication with Friends and Family: Not on file    Frequency of Social Gatherings with Friends and Family: Not on file    Attends Bahai Services: Not on file    Active Member of [address] [place] [address] [employer] or Organizations: Not on file    Attends Club or Organization Meetings: Not on file    Marital Status: Not on file   Intimate Partner Violence:     Fear of Current or Ex-Partner: Not on file    Emotionally Abused: Not on file    Physically Abused: Not on file    Sexually Abused: Not on file   Housing Stability:     Unable to Pay for Housing in the Last Year: Not on file    Number of [place] in the Last Year: Not on file    Unstable Housing in the Last Year: Not on file     FHx: Family history of skin CA: None    Meds:   Current Outpatient Medications   Medication Sig Dispense Refill    metFORMIN (GLUCOPHAGE) 500 MG tablet Take 500 mg by mouth daily (with breakfast)      rivaroxaban (XARELTO) 20 MG TABS tablet Take 20 mg by mouth daily      gabapentin (NEURONTIN) 100 MG capsule Take 100 mg by mouth every evening.  simvastatin (ZOCOR) 20 MG tablet Take 20 mg by mouth nightly      pantoprazole (PROTONIX) 20 MG tablet Take 20 mg by mouth daily      vitamin B-12 (CYANOCOBALAMIN) 500 MCG tablet Take 500 mcg by mouth daily      Cholecalciferol (VITAMIN D3) 125 MCG (5000 UT) TABS Take 10,000 Units by mouth      furosemide (LASIX) 40 MG tablet Take 40 mg by mouth daily       acetaminophen (TYLENOL) 325 MG tablet Take 650 mg by mouth every 6 hours as needed for Pain      calcium carbonate 600 MG TABS tablet Take 1 tablet by mouth daily Indications: Patient takes 200mg as needed      umeclidinium-vilanterol (ANORO ELLIPTA) 62.5-25 MCG/INH AEPB inhaler Inhale 1 puff into the lungs daily 1 each 0    blood glucose test strips (FREESTYLE LITE) strip Pt test once daily E11.9 100 strip 1    aspirin 81 MG tablet Take 81 mg by mouth daily      Compression Stockings MISC by Does not apply route 1 each 0    FREESTYLE LANCETS MISC TEST TWICE A DAY AS DIRECTED dx 250.00 100 each 3    Multiple Vitamin (MULTIVITAMIN PO) Take  by mouth daily. No current facility-administered medications for this visit. ROS   Constitutional: Negative for chills and fever. HENT: Negative for congestion, facial swelling, and voice change. Eyes: Negative for photophobia and visual disturbance. Respiratory: Negative for apnea, cough, chest tightness and shortness of breath. Cardiovascular: Negative for chest pain and palpitations. Gastrointestinal: Negative for dysphagia and early satiety. Genitourinary: Negative for difficulty urinating, dysuria, flank pain, frequency and hematuria. Musculoskeletal: Negative for new gait problem, joint swelling and myalgias. Skin: Negative for color change, pallor and rash.    Endocrine: negative for tremors, temperature intolerance or polydipsia. Allergic/Immunologic: Negative for new environmental or food allergies. Neurological: Negative for dizziness, seizures, speech difficulty, numbness. Hematological: Negative for adenopathy. Psychiatric/Behavioral: Negative for agitation and confusion. EXAM    BP (!) 155/76   Pulse 85   Ht 5' 6\" (1.676 m)   Wt 155 lb (70.3 kg)   SpO2 98%   BMI 25.02 kg/m²     GEN: NAD, pleasant, appears stated age  CVS: RRR  PULM: No respiratory distress  HEENT: PERRLA/EOMI; hearing appears within normal limits  NECK: Supple with trachea in midline, no masses  EXT: Improvement in contour without fluid - drain serosang  ABD: soft/NT/ND  NEURO: No focal deficits, no obvious CN deficits  PSYCH: Mood appropriate    PATHOLOGY/WORKUP: None    IMP: 80 y. o.female with R knee fluid. PLAN: Will have her seen by IR to determine if she would benefit from 1 additional round of sclerosis. If no, drain can be removed and she can return PRN.     Bakari Isabel MD  [address] [place] [address] [address] Reconstructive Surgery  [phone]  12/29/21

## 2022-01-05 ENCOUNTER — TELEPHONE (OUTPATIENT)
Dept: SURGERY | Age: 87
End: 2022-01-05

## 2022-01-05 DIAGNOSIS — B96.89 BACTEREMIA DUE TO ENTEROBACTER SPECIES: Primary | ICD-10-CM

## 2022-01-05 DIAGNOSIS — R78.81 BACTEREMIA DUE TO ENTEROBACTER SPECIES: Primary | ICD-10-CM

## 2022-01-05 RX ORDER — CIPROFLOXACIN 500 MG/1
500 TABLET, FILM COATED ORAL 2 TIMES DAILY
Qty: 20 TABLET | Refills: 0 | Status: SHIPPED | OUTPATIENT
Start: 2022-01-05 | End: 2022-01-15

## 2022-01-05 NOTE — PRE-PROCEDURE INSTRUCTIONS
Called patient about procedure. Told to be here at 1000  for procedure at 1100. NPO after midnight, but can take morning medication with sips of water, patient stated they are not on blood thinners. To have a responsible adult be with patient take them home and stay with them afterwards, if they do not get admitted to [address] [place] [address]. And if available bring current list of medications. No other questions or concerns.

## 2022-01-05 NOTE — TELEPHONE ENCOUNTER
Returned patient call, per MD, Dr Reagan Dubose wanted patient to report to ED for evaluation. Patient voices concerns over going to ED due to age and [place]. Her transportation services stopped running due to [place]. Discussed with Dr Reagan Dubose. We will resume Cipro by mouth, with the understanding that if her symptoms worsen, she will go to ED.

## 2022-01-05 NOTE — TELEPHONE ENCOUNTER
Pt called in to advise the knee where her catheter is placed is swollen, red, painful and warm to the touch. She stated she is not able to walk today and had trouble sleeping over night. She also has chills but no fever. She has an appointment with IR tomorrow but is worried she may need to be on antibiotics before the procedure. Please call her back to discuss what should be done. If she needs a prescription for antibiotics please call it in to the [address] by about 2pm so they will be able to deliver it to her today.

## 2022-01-10 ENCOUNTER — HOSPITAL ENCOUNTER (OUTPATIENT)
Dept: INTERVENTIONAL RADIOLOGY/VASCULAR | Age: 87
Discharge: HOME | End: 2022-01-10
Payer: MEDICARE

## 2022-01-10 DIAGNOSIS — L02.91 ABSCESS: ICD-10-CM

## 2022-01-10 PROCEDURE — 6360000004 HC RX CONTRAST MEDICATION: Performed by: RADIOLOGY

## 2022-01-10 PROCEDURE — 2500000003 HC RX 250 WO HCPCS

## 2022-01-10 PROCEDURE — 6360000002 HC RX W HCPCS

## 2022-01-10 PROCEDURE — 49185 SCLEROTX FLUID COLLECTION: CPT

## 2022-01-10 RX ORDER — ALCOHOL 1 ML/ML
50 INJECTION, SOLUTION PERCUTANEOUS ONCE
Status: COMPLETED | OUTPATIENT
Start: 2022-01-10 | End: 2022-01-10

## 2022-01-10 RX ORDER — ALCOHOL 0.98 ML/ML
50 INJECTION INTRASPINAL ONCE
Status: DISCONTINUED | OUTPATIENT
Start: 2022-01-10 | End: 2022-01-10 | Stop reason: SDUPTHER

## 2022-01-10 RX ADMIN — IOHEXOL 50 ML: 350 INJECTION, SOLUTION INTRAVENOUS at 10:29

## 2022-01-10 RX ADMIN — ALCOHOL 50 ML: 1 INJECTION, SOLUTION PERCUTANEOUS at 10:28

## 2022-01-14 NOTE — PRE-PROCEDURE INSTRUCTIONS
Attempted to call patient about procedure. No answer. Voicemail left. Told to be here at 0830 for procedure at 0930. NPO after midnight, but can take morning medication with sips of water, office should have told them when and if they should stop any blood thinners. To have a responsible adult be with patient take them home and stay with them afterwards, if they are not admitted to hospital afterwards. And if available bring current list of medications.

## 2022-01-17 ENCOUNTER — HOSPITAL ENCOUNTER (OUTPATIENT)
Dept: INTERVENTIONAL RADIOLOGY/VASCULAR | Age: 87
Discharge: HOME | End: 2022-01-17
Payer: MEDICARE

## 2022-01-17 DIAGNOSIS — L02.91 ABSCESS: ICD-10-CM

## 2022-01-17 PROCEDURE — 49185 SCLEROTX FLUID COLLECTION: CPT

## 2022-01-17 PROCEDURE — 2500000003 HC RX 250 WO HCPCS

## 2022-01-17 PROCEDURE — 2500000003 HC RX 250 WO HCPCS: Performed by: RADIOLOGY

## 2022-01-17 PROCEDURE — 6360000004 HC RX CONTRAST MEDICATION: Performed by: RADIOLOGY

## 2022-01-17 RX ORDER — ALCOHOL 1 ML/ML
10 INJECTION, SOLUTION PERCUTANEOUS ONCE
Status: DISCONTINUED | OUTPATIENT
Start: 2022-01-17 | End: 2022-01-18 | Stop reason: HOSPADM

## 2022-01-17 RX ORDER — ALCOHOL 1 ML/ML
50 INJECTION, SOLUTION PERCUTANEOUS ONCE
Status: DISCONTINUED | OUTPATIENT
Start: 2022-01-17 | End: 2022-01-17

## 2022-01-17 RX ORDER — ALCOHOL 1 ML/ML
10 INJECTION, SOLUTION PERCUTANEOUS ONCE
Status: COMPLETED | OUTPATIENT
Start: 2022-01-17 | End: 2022-01-17

## 2022-01-17 RX ADMIN — IOHEXOL 50 ML: 350 INJECTION, SOLUTION INTRAVENOUS at 10:33

## 2022-01-17 RX ADMIN — ALCOHOL 10 ML: 1 INJECTION, SOLUTION PERCUTANEOUS at 10:01

## 2022-02-09 ENCOUNTER — TELEPHONE (OUTPATIENT)
Dept: SURGERY | Age: 87
End: 2022-02-09

## 2022-02-09 DIAGNOSIS — M25.461 FLUID IN RIGHT KNEE JOINT: Primary | ICD-10-CM

## 2022-02-09 NOTE — TELEPHONE ENCOUNTER
Returned patient call. She completed the sclerotherapy on 22. And drain was removed. Patient states that she was advised to follow up with us 1 week after Radiology department, but she was unable to follow up due to her daughter's passing. Patient states that her RA comes in and evaluates the leg. Swelling is radiating down to toes. Knee is hot to touch, reddened. Patient denies fevers or chills but is very concerned that she would get infected again. Per MD, patient to report to ED. Patient wishes not to report to Ed due to concerns with COVID, her age and scheduling surrounding patient's daughters . Md does not want to give ATB's until fluid is taken off and sent for cytology. Called IR and left message, patient needs to be scheduled for aspiration and possible drain placement, but is only available . Will hold note open to await IR call to schedule.

## 2022-02-09 NOTE — TELEPHONE ENCOUNTER
Patient called in stating that her right knee is swollen, tender and hot to the touch    Patient is concerned that she has an infection    Please contact the patient at [phone]

## 2022-02-11 NOTE — TELEPHONE ENCOUNTER
Received a return call from IR. Scheduled patient for IR eval imaging  on Wed 16th at 1030am, 915am arrival.     Patient will  Need to be NPO and have a . Also patient will need to hold Xarelto. Patient will be evaluated and will have  possible aspiration/ drain placement    Left detailed message for patient and requested a return call.

## 2022-02-14 ENCOUNTER — TELEPHONE (OUTPATIENT)
Dept: SURGERY | Age: 87
End: 2022-02-14

## 2022-02-14 NOTE — TELEPHONE ENCOUNTER
Patient called in stating that she is unable make it to her IR appt because the appt time is too early    Please contact the patient at [phone]

## 2022-02-14 NOTE — TELEPHONE ENCOUNTER
Message received from IR that the patient was unable to make it to her IR appt because the appt time is too early. RN attempted to call to find out a more convenient time to schedule with IR. Message left with patient instructing to call Dr. Valentin Ziegler office back.

## 2022-02-15 NOTE — TELEPHONE ENCOUNTER
Please see additional open call.  DONE
Pt was returning call to reschedule her IR appointment. 9:15 am works better for her. Please call pt to reschedule.
well appearing

## 2022-02-15 NOTE — TELEPHONE ENCOUNTER
Patient is scheduled with IR tomorrow 2/16/22 at 915 am.     This is the latest patient can arrive. Patient Epic desk shows imaging at [phone] and 9 am.     Discussed with Nicko Cuevas at [employer], she states that its ok for [phone] arrival.     LM with patient with details.

## 2022-02-16 ENCOUNTER — HOSPITAL ENCOUNTER (OUTPATIENT)
Dept: CT IMAGING | Age: 87
Discharge: HOME | End: 2022-02-16
Payer: MEDICARE

## 2022-02-16 VITALS
SYSTOLIC BLOOD PRESSURE: 151 MMHG | TEMPERATURE: 97.6 F | DIASTOLIC BLOOD PRESSURE: 76 MMHG | OXYGEN SATURATION: 93 % | RESPIRATION RATE: 18 BRPM | HEART RATE: 78 BPM

## 2022-02-16 DIAGNOSIS — M25.461 FLUID IN RIGHT KNEE JOINT: ICD-10-CM

## 2022-02-16 LAB
APTT: 30.3 SEC (ref 26.2–38.6)
INR BLD: 1 (ref 0.88–1.12)
PLATELET # BLD: 217 K/UL (ref 135–450)
PROTHROMBIN TIME: 11.3 SEC (ref 9.9–12.7)

## 2022-02-16 PROCEDURE — 2709999900 CT DRAINAGE HEMATOMA/SEROMA/FLUID COLLECTION

## 2022-02-16 PROCEDURE — 2500000003 HC RX 250 WO HCPCS: Performed by: RADIOLOGY

## 2022-02-16 PROCEDURE — 87205 SMEAR GRAM STAIN: CPT

## 2022-02-16 PROCEDURE — 36415 COLL VENOUS BLD VENIPUNCTURE: CPT

## 2022-02-16 PROCEDURE — 73700 CT LOWER EXTREMITY W/O DYE: CPT

## 2022-02-16 PROCEDURE — 85049 AUTOMATED PLATELET COUNT: CPT

## 2022-02-16 PROCEDURE — 87070 CULTURE OTHR SPECIMN AEROBIC: CPT

## 2022-02-16 PROCEDURE — 85730 THROMBOPLASTIN TIME PARTIAL: CPT

## 2022-02-16 PROCEDURE — 85610 PROTHROMBIN TIME: CPT

## 2022-02-16 PROCEDURE — 87075 CULTR BACTERIA EXCEPT BLOOD: CPT

## 2022-02-16 RX ORDER — LIDOCAINE HYDROCHLORIDE 10 MG/ML
15 INJECTION, SOLUTION EPIDURAL; INFILTRATION; INTRACAUDAL; PERINEURAL ONCE
Status: COMPLETED | OUTPATIENT
Start: 2022-02-16 | End: 2022-02-16

## 2022-02-16 RX ORDER — FENTANYL CITRATE 50 UG/ML
50 INJECTION, SOLUTION INTRAMUSCULAR; INTRAVENOUS ONCE
Status: DISCONTINUED | OUTPATIENT
Start: 2022-02-16 | End: 2022-02-17 | Stop reason: HOSPADM

## 2022-02-16 RX ORDER — MIDAZOLAM HYDROCHLORIDE 1 MG/ML
1 INJECTION INTRAMUSCULAR; INTRAVENOUS ONCE
Status: DISCONTINUED | OUTPATIENT
Start: 2022-02-16 | End: 2022-02-17 | Stop reason: HOSPADM

## 2022-02-16 RX ADMIN — LIDOCAINE HYDROCHLORIDE 15 ML: 10 INJECTION, SOLUTION EPIDURAL; INFILTRATION; INTRACAUDAL; PERINEURAL at 11:33

## 2022-02-16 NOTE — SEDATION DOCUMENTATION
IMAGING SERVICES NURSING PROGRESS NOTE    Procedure:  Right Knee Aspiration  February 16, 2022  Deysi Shayyarnol      Allergies: Allergies   Allergen Reactions    Erythromycin      Upset stomach      Penicillins Swelling       There were no vitals filed for this visit. Recent lab work reviewed with MD: yes    Procedure explained to patient by MD: yes   Informed consent obtained:yes  Family with patient:no    Mental Status:  Normal  Readiness to learn:  Yes  Barriers to learning: No    Pain Assessment Pre-Procedure:  Pain Present:  no  Pain Score:  0  Pain Quality/Description:  na    Time out Procedure Verification with:  [x] RN  [x] Physician  [x] Patient  [x] Other: CT Technologist  Procedure site marked, if applicable:  Yes    Note: Patient arrived A & O x 4, denies pain, breathing easily on room air, Spoke to Dr. Adriana Schroeder prior to procedure. Procedural sedation:  None   Post Procedureal Note:  Patient tolerated procedure well. Breathing easily on room air. Removed approximately 45mls of serous fluid from right knee.          Plan of Care Goals:  Safety measures met:  Yes  Patient understands explanation of procedure:  Yes    Lashay Garcia RN.  R.N. 2/16/2022

## 2022-02-21 LAB — ANAEROBIC CULTURE: NORMAL

## 2022-03-07 LAB
BODY FLUID CULTURE, STERILE: NORMAL
GRAM STAIN RESULT: NORMAL

## 2023-09-21 NOTE — TELEPHONE ENCOUNTER
Samuel Jiménez. Returned call to Harish Sheridan at [employer] and discussed patient. They requested an order for Imaging to IR with Consult for sclerotherapy in the notes. DONE. They will have Dr Wang Melendez look into order and call patient to discuss.